# Patient Record
Sex: FEMALE | Race: WHITE | HISPANIC OR LATINO | Employment: FULL TIME | URBAN - METROPOLITAN AREA
[De-identification: names, ages, dates, MRNs, and addresses within clinical notes are randomized per-mention and may not be internally consistent; named-entity substitution may affect disease eponyms.]

---

## 2017-02-01 ENCOUNTER — TRANSFERRED RECORDS (OUTPATIENT)
Dept: HEALTH INFORMATION MANAGEMENT | Facility: CLINIC | Age: 32
End: 2017-02-01

## 2017-02-01 LAB — PAP SMEAR - HIM PATIENT REPORTED: NEGATIVE

## 2017-02-10 ENCOUNTER — TRANSFERRED RECORDS (OUTPATIENT)
Dept: CARDIOLOGY | Facility: CLINIC | Age: 32
End: 2017-02-10

## 2017-02-13 PROBLEM — R07.9 ACUTE CHEST PAIN: Status: ACTIVE | Noted: 2017-02-13

## 2017-02-13 PROBLEM — R00.2 PALPITATIONS: Status: ACTIVE | Noted: 2017-02-13

## 2017-02-13 PROBLEM — Z30.09 GENERAL COUNSELING FOR PRESCRIPTION OF ORAL CONTRACEPTIVES: Status: ACTIVE | Noted: 2017-02-13

## 2017-02-14 ENCOUNTER — OFFICE VISIT (OUTPATIENT)
Dept: CARDIOLOGY | Facility: CLINIC | Age: 32
End: 2017-02-14
Payer: COMMERCIAL

## 2017-02-14 VITALS
WEIGHT: 231.4 LBS | HEIGHT: 68 IN | HEART RATE: 101 BPM | BODY MASS INDEX: 35.07 KG/M2 | SYSTOLIC BLOOD PRESSURE: 134 MMHG | DIASTOLIC BLOOD PRESSURE: 84 MMHG

## 2017-02-14 DIAGNOSIS — R07.89 ATYPICAL CHEST PAIN: Primary | ICD-10-CM

## 2017-02-14 PROCEDURE — 93000 ELECTROCARDIOGRAM COMPLETE: CPT | Performed by: INTERNAL MEDICINE

## 2017-02-14 PROCEDURE — 99203 OFFICE O/P NEW LOW 30 MIN: CPT | Mod: 25 | Performed by: INTERNAL MEDICINE

## 2017-02-14 NOTE — MR AVS SNAPSHOT
"              After Visit Summary   2/14/2017    Nicolasa Moore    MRN: [id number]           Patient Information     Date Of Birth          1985        Visit Information        Provider Department      2/14/2017 9:15 AM Flaquito Mauro MD [facility] AT [place]        Today's Diagnoses     Atypical chest pain    -  1       Follow-ups after your visit        Who to contact     If you have questions or need follow up information about today's clinic visit or your schedule please contact [facility] directly at [phone].  Normal or non-critical lab and imaging results will be communicated to you by EveryScapehart, letter or phone within 4 business days after the clinic has received the results. If you do not hear from us within 7 days, please contact the clinic through EveryScapehart or phone. If you have a critical or abnormal lab result, we will notify you by phone as soon as possible.  Submit refill requests through [employer] or call your pharmacy and they will forward the refill request to us. Please allow 3 business days for your refill to be completed.          Additional Information About Your Visit        MyChart Information     [employer] gives you secure access to your electronic health record. If you see a primary care provider, you can also send messages to your care team and make appointments. If you have questions, please call your primary care clinic.  If you do not have a primary care provider, please call [phone] and they will assist you.        Care EveryWhere ID     This is your Care EveryWhere ID. This could be used by other organizations to access your [place] medical records  [id number]        Your Vitals Were     Pulse Height BMI (Body Mass Index)             101 1.727 m (5' 8\") 35.18 kg/m2          Blood Pressure from Last 3 Encounters:   02/14/17 134/84   06/22/15 120/78   05/20/15 108/68    Weight from Last 3 " Encounters:   02/14/17 105 kg (231 lb 6.4 oz)   06/22/15 105.7 kg (233 lb)   05/20/15 108.7 kg (239 lb 9.6 oz)              We Performed the Following     EKG 12-lead complete w/read - Clinics (performed today)        Primary Care Provider Office Phone # Fax #    Otis Murray [phone]       [facility] [address] [facility] [address]        Thank you!     Thank you for choosing [facility] [place]  for your care. Our goal is always to provide you with excellent care. Hearing back from our patients is one way we can continue to improve our services. Please take a few minutes to complete the written survey that you may receive in the mail after your visit with us. Thank you!             Your Updated Medication List - Protect others around you: Learn how to safely use, store and throw away your medicines at www.disposemymeds.org.          This list is accurate as of: 2/14/17  2:32 PM.  Always use your most recent med list.                   Brand Name Dispense Instructions for use    acetaminophen 650 MG 8 hour tablet     100 tablet    Take 650 mg by mouth every 4 hours as needed for mild pain or fever (greater than or equal to 38? C /100.4? F (oral) or 38.5? C/ 101.4? F (core).)       ibuprofen 400 MG tablet    ADVIL/MOTRIN    120 tablet    Take 1-2 tablets (400-800 mg) by mouth every 6 hours as needed for other (cramping)       norgestim-eth estrad triphasic 0.18/0.215/0.25 MG-35 MCG per tablet    TRINESSA (28)    84 tablet    Take 1 tablet by mouth daily

## 2017-02-14 NOTE — LETTER
2/14/2017    Otis Murray MD  [facility]   [address] [facility] [address]    RE: Nicolasa Moore       Dear Colleague,    I had the pleasure of seeing Nicolasa Moore in the [facility].    Ms. Moore is 31 years old and is here for followup after an episode of chest discomfort which is still ongoing.  She was evaluated at [facility] and was discharged.      Several days ago this young lady experienced the sudden onset of substernal chest discomfort which felt like somebody squeezing her from front to back.  Associated with this is palpitations.  She was out of breath.  She was finding it difficult to inspire deeply.  Occasionally, there would be numbness and tingling down the left arm as well.  She would feel dizzy when she has these symptoms.  While her symptoms were ongoing, an EKG done at [facility] showed normal findings.  A chest x-ray showed scoliosis but no significant cardiopulmonary lesions.  D-dimer was within normal limits, as was her basic metabolic panel.  She is known to be anemic.  She has just given birth to a baby girl.  This is her first pregnancy.      There is no history of diabetes, hypertension or hyperlipidemia.      Her family history is negative for premature atherosclerotic disease.  There is no history of drug, alcohol or tobacco use.  She has concerns about angina.  I explained to her that her symptoms are not very compatible with angina.  She is having ongoing chest pressure at this time.  EKG is normal.      PHYSICAL EXAMINATION:  Cardiovascular system is normal.  She denies any significant stresses in her life at this time.  There is no history of psychiatric illnesses.     Outpatient Encounter Prescriptions as of 2/14/2017   Medication Sig Dispense Refill     norgestim-eth estrad triphasic (TRINESSA, 28,) 0.18/0.215/0.25 MG-35 MCG per tablet Take 1 tablet by mouth daily 84 tablet 3      [DISCONTINUED] acetaminophen 650 MG TABS Take 650 mg by mouth every 4 hours as needed for mild pain or fever (greater than or equal to 38  C /100.4  F (oral) or 38.5  C/ 101.4  F (core).) 100 tablet      [DISCONTINUED] ibuprofen (ADVIL,MOTRIN) 400 MG tablet Take 1-2 tablets (400-800 mg) by mouth every 6 hours as needed for other (cramping) 120 tablet      [DISCONTINUED] norethindrone (MICRONOR) 0.35 MG tablet Take 1 tablet (0.35 mg) by mouth daily (Patient not taking: Reported on 2/14/2017) 3 Package 3     [DISCONTINUED] Prenatal Vit-Fe Fumarate-FA (PRENATAL MULTIVITAMIN  PLUS IRON) 27-0.8 MG TABS Take 1 tablet by mouth daily 100 tablet 3     Facility-Administered Encounter Medications as of 2/14/2017   Medication Dose Route Frequency Provider Last Rate Last Dose     ondansetron (ZOFRAN) injection 4 mg  4 mg Intravenous Q6H PRN Joe Capellan MD         NO Rho (D) immune globulin (RhoGam) needed - mother Rh NEGATIVE - NOT Clinically indicated at this time   Does not apply Continuous PRN Joe Capellan MD          ASSESSMENT AND PLAN:  I do think this lady's symptoms are compatible with hyperventilation syndrome, likely due to underlying anxiety.  I explained to her that at age of 31 and without cardiac risk factors, angina is extremely unlikely.  This is further supported by the fact that her EKG while she is having chest symptoms is completely normal.  No significant arrhythmias have been detected.  I reassured her, and she is discharged from our clinic.     Again, thank you for allowing me to participate in the care of your patient.      Sincerely,    DR JOE CLARKE MD     [facility]
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
## 2017-02-15 NOTE — PROGRESS NOTES
HISTORY OF PRESENT ILLNESS:  Ms. Moore is 31 years old and is here for followup after an episode of chest discomfort which is still ongoing.  She was evaluated at [facility] and was discharged.      Several days ago this young lady experienced the sudden onset of substernal chest discomfort which felt like somebody squeezing her from front to back.  Associated with this is palpitations.  She was out of breath.  She was finding it difficult to inspire deeply.  Occasionally, there would be numbness and tingling down the left arm as well.  She would feel dizzy when she has these symptoms.  While her symptoms were ongoing, an EKG done at [facility] showed normal findings.  A chest x-ray showed scoliosis but no significant cardiopulmonary lesions.  D-dimer was within normal limits, as was her basic metabolic panel.  She is known to be anemic.  She has just given birth to a baby girl.  This is her first pregnancy.      There is no history of diabetes, hypertension or hyperlipidemia.      Her family history is negative for premature atherosclerotic disease.  There is no history of drug, alcohol or tobacco use.  She has concerns about angina.  I explained to her that her symptoms are not very compatible with angina.  She is having ongoing chest pressure at this time.  EKG is normal.      PHYSICAL EXAMINATION:  Cardiovascular system is normal.  She denies any significant stresses in her life at this time.  There is no history of psychiatric illnesses.      ASSESSMENT AND PLAN:  I do think this lady's symptoms are compatible with hyperventilation syndrome, likely due to underlying anxiety.  I explained to her that at age of 31 and without cardiac risk factors, angina is extremely unlikely.  This is further supported by the fact that her EKG while she is having chest symptoms is completely normal.  No significant arrhythmias have been detected.  I reassured her, and she is discharged from our clinic.          JOE CLARKE MD, [facility]             D: 2017 14:32   T: 2017 20:16   MT: CHRISSY      Name:     DANIELE WILSON   MRN:      [id number]        Account:      [id number]   :      1985           Service Date: 2017      Document: [id number]

## 2017-03-07 ENCOUNTER — OFFICE VISIT (OUTPATIENT)
Dept: INTERNAL MEDICINE | Facility: CLINIC | Age: 32
End: 2017-03-07
Payer: COMMERCIAL

## 2017-03-07 VITALS
BODY MASS INDEX: 37.38 KG/M2 | SYSTOLIC BLOOD PRESSURE: 130 MMHG | WEIGHT: 232.6 LBS | DIASTOLIC BLOOD PRESSURE: 90 MMHG | HEIGHT: 66 IN | HEART RATE: 112 BPM | TEMPERATURE: 98.4 F | OXYGEN SATURATION: 100 %

## 2017-03-07 DIAGNOSIS — R00.2 PALPITATIONS: Primary | ICD-10-CM

## 2017-03-07 DIAGNOSIS — Z32.01 PREGNANCY TEST POSITIVE: ICD-10-CM

## 2017-03-07 DIAGNOSIS — N92.6 MISSED PERIOD: ICD-10-CM

## 2017-03-07 LAB — BETA HCG QUAL IFA URINE: POSITIVE

## 2017-03-07 PROCEDURE — 84703 CHORIONIC GONADOTROPIN ASSAY: CPT | Performed by: INTERNAL MEDICINE

## 2017-03-07 PROCEDURE — 99214 OFFICE O/P EST MOD 30 MIN: CPT | Performed by: INTERNAL MEDICINE

## 2017-03-07 NOTE — MR AVS SNAPSHOT
After Visit Summary   3/7/2017    Nicolasa Moore    MRN: [id number]           Patient Information     Date Of Birth          1985        Visit Information        Provider Department      3/7/2017 2:00 PM Nyasia Smith MD [facility]        Today's Diagnoses     Pregnancy test positive    -  1    Palpitations          Care Instructions    You are pregnant!    This is likely the cause of your symptoms (increased blood flow to support growing uterus and fetus), changes in hormones causing breast tenderness.    ---    To fully evaluate the palpitations, recommend a heart monitor.    We will contact you with scheduling information for the heart monitor.                   Follow-ups after your visit        Additional Services     OB/GYN REFERRAL       Your provider has referred you to:  [facility]: [facility] [phone]   [email or web address]    Please be aware that coverage of these services is subject to the terms and limitations of your health insurance plan.  Call member services at your health plan with any benefit or coverage questions.      Please bring the following with you to your appointment:    (1) Any X-Rays, CTs or MRIs which have been performed.  Contact the facility where they were done to arrange for  prior to your scheduled appointment.   (2) List of current medications   (3) This referral request   (4) Any documents/labs given to you for this referral                  Future tests that were ordered for you today     Open Future Orders        Priority Expected Expires Ordered    Zio Patch Holter Routine  4/21/2017 3/7/2017            Who to contact     If you have questions or need follow up information about today's clinic visit or your schedule please contact [facility] directly at [phone].  Normal or non-critical lab and imaging results will be  "communicated to you by [employer], letter or phone within 4 business days after the clinic has received the results. If you do not hear from us within 7 days, please contact the clinic through Touch[employer] or phone. If you have a critical or abnormal lab result, we will notify you by phone as soon as possible.  Submit refill requests through Touch[employer] or call your pharmacy and they will forward the refill request to us. Please allow 3 business days for your refill to be completed.          Additional Information About Your Visit        Touchstorm Information     Touch[employer] gives you secure access to your electronic health record. If you see a primary care provider, you can also send messages to your care team and make appointments. If you have questions, please call your primary care clinic.  If you do not have a primary care provider, please call [phone] and they will assist you.        Care EveryWhere ID     This is your Care EveryWhere ID. This could be used by other organizations to access your [place] medical records  [id number]        Your Vitals Were     Pulse Temperature Height Last Period Pulse Oximetry Breastfeeding?    112 98.4  F (36.9  C) (Oral) 5' 6\" (1.676 m) 01/17/2017 (Approximate) 100% No    BMI (Body Mass Index)                   37.54 kg/m2            Blood Pressure from Last 3 Encounters:   03/07/17 130/90   02/14/17 134/84   06/22/15 120/78    Weight from Last 3 Encounters:   03/07/17 232 lb 9.6 oz (105.5 kg)   02/14/17 231 lb 6.4 oz (105 kg)   06/22/15 233 lb (105.7 kg)              We Performed the Following     Beta HCG qual IFA urine     OB/GYN REFERRAL        Primary Care Provider Office Phone # Fax #    Otis Murray [phone]       [facility] [address] [facility] [address]        Thank you!     Thank you for choosing [facility]  for your care. Our goal is always to provide you with excellent care. Hearing back from our " patients is one way we can continue to improve our services. Please take a few minutes to complete the written survey that you may receive in the mail after your visit with us. Thank you!             Your Updated Medication List - Protect others around you: Learn how to safely use, store and throw away your medicines at www.disposemymeds.org.          This list is accurate as of: 3/7/17  2:21 PM.  Always use your most recent med list.                   Brand Name Dispense Instructions for use    norgestim-eth estrad triphasic 0.18/0.215/0.25 MG-35 MCG per tablet    TRINESSA (28)    84 tablet    Take 1 tablet by mouth daily

## 2017-03-07 NOTE — PATIENT INSTRUCTIONS
You are pregnant!    This is likely the cause of your symptoms (increased blood flow to support growing uterus and fetus), changes in hormones causing breast tenderness.    ---    To fully evaluate the palpitations, recommend a heart monitor.    We will contact you with scheduling information for the heart monitor.

## 2017-03-07 NOTE — NURSING NOTE
"Chief Complaint   Patient presents with     Palpitations     x 1 month. Palpitations and L sided chest pain, Tingling in L arm. Home Pregnancy test positive last night.       Initial /90 (BP Location: Left arm, Patient Position: Chair, Cuff Size: Adult Regular)  Pulse 112  Temp 98.4  F (36.9  C) (Oral)  Ht 5' 6\" (1.676 m)  Wt 232 lb 9.6 oz (105.5 kg)  LMP 01/17/2017 (Approximate)  SpO2 100%  Breastfeeding? No  BMI 37.54 kg/m2 Estimated body mass index is 37.54 kg/(m^2) as calculated from the following:    Height as of this encounter: 5' 6\" (1.676 m).    Weight as of this encounter: 232 lb 9.6 oz (105.5 kg).  Medication Reconciliation: complete     Kaminibose MA    "

## 2017-03-07 NOTE — PROGRESS NOTES
"  SUBJECTIVE:                                                      HPI: Nicolasa Moore is a pleasant 31 year old female who presents with palpitations and chest pain:    - ongoing issue for last month  - has been seen at [facility] ER and by cardiology  - physical exam unremarkable other than sinus tachycardia  - labs including D-Dimer, EKGs, and chest xray have all been unrevealing    - patient's symptoms have continued  - unclear if symptoms are continuous or episodic - patient reports both at different times  - symptoms include:   - left sided chest pain     - dull/achy    - radiation to left axilla, left back and up left neck and head   - left-sided chest pressure and heaviness   - palpitations   - shortness of breath   - light-headedness and presyncope   - extreme fatigue   - cold sweats   - nausea and occasional vomiting    - admits to being under some stress, but doesn't feel particularly anxious except about her symptoms  - no personal or FH of mood disorders    Patient also reports a positive home pregnancy test:  - on OCP  - was supposed to get period ~1-2 weeks ago - never came  - has also noticed increased breast tenderness  - admits to missing a couple doses of OCP and taking other doses at irregular times  - pregnancy test in ER  was negative    No significant active medical problems.  (baby born 5/5/15).  a  The medication, allergy, and problem lists have been reviewed and updated as appropriate.     OBJECTIVE:                                                      /90 (BP Location: Left arm, Patient Position: Chair, Cuff Size: Adult Regular)  Pulse 112  Temp 98.4  F (36.9  C) (Oral)  Ht 5' 6\" (1.676 m)  Wt 232 lb 9.6 oz (105.5 kg)  LMP 2017 (Approximate)  SpO2 100%  Breastfeeding? No  BMI 37.54 kg/m2  Constitutional: well-appearing  Respiratory: normal respiratory effort; clear to auscultation bilaterally  Cardiovascular: tachycardic; no edema  Gastrointestinal: " soft, non-tender, non-distended, and bowel sounds present  Musculoskeletal: normal gait and station  Psych: normal judgment and insight; normal mood and affect; recent and remote memory intact    Urine pregnancy test (today - in lab): positive    ASSESSMENT/PLAN:                                                      (R00.2) Palpitations  (primary encounter diagnosis)  Comment:    - strongly suspect that patient's symptoms are related to pregnancy.    - change in hormones and increase in blood flow during pregnancy likely account for most of her symptoms.   - generally reassured by negative work-up to date - do not think anything acute is occurring.   - cannot rule out periodic arrhythmia, but again suspect mild tachycardia due to pregnancy.  Plan:    - discussed my suspicions with patient who feels somewhat reassured.   - offered, but did not recommend, a Holter monitor or Zio patch to further evaluate patient's palpitations - she declines for now.   - if palpitations worsen, change, or persist, patient to contact me - can order rhythm monitoring then.     (N92.6) Missed period  (Z32.01) Pregnancy test positive  Comment:    - since pregnancy test was negative last month, patient is likely in very early pregnancy 4-6 weeks.   - this is likely due to not using OCP consistently.   Plan:    - STOP OCP.   - STOP all NSAIDs (ibuprofen, Advil, Motrin, Aleve, aspirin); may use Tylenol as needed.   - referred to ob/gyn for continued care.     The instructions on the AVS were discussed and explained to the patient. Patient expressed understanding of instructions.    A total of 25 minutes were spent face-to-face with this patient during this encounter and over half of that time was spent on counseling and coordination of care re: above diagnoses and plans of care.     Nyasia Smith MD   [address] [address]  T: [phone], F: [phone]

## 2017-03-08 DIAGNOSIS — Z32.01 PREGNANCY TEST POSITIVE: Primary | ICD-10-CM

## 2017-04-12 ENCOUNTER — RADIANT APPOINTMENT (OUTPATIENT)
Dept: ULTRASOUND IMAGING | Facility: CLINIC | Age: 32
End: 2017-04-12
Attending: OBSTETRICS & GYNECOLOGY
Payer: COMMERCIAL

## 2017-04-12 ENCOUNTER — ALLIED HEALTH/NURSE VISIT (OUTPATIENT)
Dept: NURSING | Facility: CLINIC | Age: 32
End: 2017-04-12
Payer: COMMERCIAL

## 2017-04-12 DIAGNOSIS — Z32.01 PREGNANCY TEST POSITIVE: ICD-10-CM

## 2017-04-12 LAB
ABO + RH BLD: NORMAL
ABO + RH BLD: NORMAL
BLD GP AB SCN SERPL QL: NORMAL
BLOOD BANK CMNT PATIENT-IMP: NORMAL
ERYTHROCYTE [DISTWIDTH] IN BLOOD BY AUTOMATED COUNT: 15.3 % (ref 10–15)
HCT VFR BLD AUTO: 36.1 % (ref 35–47)
HGB BLD-MCNC: 11.9 G/DL (ref 11.7–15.7)
MCH RBC QN AUTO: 26.6 PG (ref 26.5–33)
MCHC RBC AUTO-ENTMCNC: 33 G/DL (ref 31.5–36.5)
MCV RBC AUTO: 81 FL (ref 78–100)
PLATELET # BLD AUTO: 245 10E9/L (ref 150–450)
RBC # BLD AUTO: 4.47 10E12/L (ref 3.8–5.2)
SPECIMEN EXP DATE BLD: NORMAL
WBC # BLD AUTO: 11.8 10E9/L (ref 4–11)

## 2017-04-12 PROCEDURE — 86900 BLOOD TYPING SEROLOGIC ABO: CPT | Performed by: OBSTETRICS & GYNECOLOGY

## 2017-04-12 PROCEDURE — 86850 RBC ANTIBODY SCREEN: CPT | Performed by: OBSTETRICS & GYNECOLOGY

## 2017-04-12 PROCEDURE — 87086 URINE CULTURE/COLONY COUNT: CPT | Performed by: OBSTETRICS & GYNECOLOGY

## 2017-04-12 PROCEDURE — 85027 COMPLETE CBC AUTOMATED: CPT | Performed by: OBSTETRICS & GYNECOLOGY

## 2017-04-12 PROCEDURE — 36415 COLL VENOUS BLD VENIPUNCTURE: CPT | Performed by: OBSTETRICS & GYNECOLOGY

## 2017-04-12 PROCEDURE — 99207 ZZC NO CHARGE NURSE ONLY: CPT

## 2017-04-12 PROCEDURE — 86901 BLOOD TYPING SEROLOGIC RH(D): CPT | Performed by: OBSTETRICS & GYNECOLOGY

## 2017-04-12 PROCEDURE — 76801 OB US < 14 WKS SINGLE FETUS: CPT | Performed by: OBSTETRICS & GYNECOLOGY

## 2017-04-12 PROCEDURE — 87389 HIV-1 AG W/HIV-1&-2 AB AG IA: CPT | Performed by: OBSTETRICS & GYNECOLOGY

## 2017-04-12 PROCEDURE — 86780 TREPONEMA PALLIDUM: CPT | Performed by: OBSTETRICS & GYNECOLOGY

## 2017-04-12 PROCEDURE — 86762 RUBELLA ANTIBODY: CPT | Performed by: OBSTETRICS & GYNECOLOGY

## 2017-04-12 PROCEDURE — 87340 HEPATITIS B SURFACE AG IA: CPT | Performed by: OBSTETRICS & GYNECOLOGY

## 2017-04-12 NOTE — NURSING NOTE
"NEW PRENATAL EDUCATION  The following encounter information was actually performed during a group prenatal education class on 4/12/17 by HERLINDA Castaneda RN  and entered at a later date.  Patient given information for prenatal education along with \"The Expectant Family\" booklet.     "

## 2017-04-12 NOTE — MR AVS SNAPSHOT
After Visit Summary   4/12/2017    Nicolasa Moore    MRN: [id number]           Patient Information     Date Of Birth          1985        Visit Information        Provider Department      4/12/2017 9:00 AM OX PRENATAL NURSE [facility]        Today's Diagnoses     Pregnancy test positive           Follow-ups after your visit        Who to contact     If you have questions or need follow up information about today's clinic visit or your schedule please contact [facility] directly at [phone].  Normal or non-critical lab and imaging results will be communicated to you by Valkeehart, letter or phone within 4 business days after the clinic has received the results. If you do not hear from us within 7 days, please contact the clinic through [employer] or phone. If you have a critical or abnormal lab result, we will notify you by phone as soon as possible.  Submit refill requests through Evri or call your pharmacy and they will forward the refill request to us. Please allow 3 business days for your refill to be completed.          Additional Information About Your Visit        MyChart Information     Evri gives you secure access to your electronic health record. If you see a primary care provider, you can also send messages to your care team and make appointments. If you have questions, please call your primary care clinic.  If you do not have a primary care provider, please call [phone] and they will assist you.        Care EveryWhere ID     This is your Care EveryWhere ID. This could be used by other organizations to access your [place] medical records  [id number]        Your Vitals Were     Last Period                   01/17/2017 (Approximate)            Blood Pressure from Last 3 Encounters:   03/07/17 130/90   02/14/17 134/84   06/22/15 120/78    Weight from Last 3 Encounters:   03/07/17 232 lb 9.6 oz (105.5 kg)   02/14/17 231 lb  6.4 oz (105 kg)   06/22/15 233 lb (105.7 kg)              We Performed the Following     ABO/Rh type and screen     Anti Treponema     CBC with platelets     Hepatitis B surface antigen     HIV Antigen Antibody Combo     Rubella Antibody IgG Quantitative     Urine Culture Aerobic Bacterial        Primary Care Provider Office Phone # Fax #    Otis Murray [phone]       XXX RETIRED  [address] [facility] [address]        Thank you!     Thank you for choosing [facility]  for your care. Our goal is always to provide you with excellent care. Hearing back from our patients is one way we can continue to improve our services. Please take a few minutes to complete the written survey that you may receive in the mail after your visit with us. Thank you!             Your Updated Medication List - Protect others around you: Learn how to safely use, store and throw away your medicines at www.disposemymeds.org.          This list is accurate as of: 4/12/17 11:36 AM.  Always use your most recent med list.                   Brand Name Dispense Instructions for use    norgestim-eth estrad triphasic 0.18/0.215/0.25 MG-35 MCG per tablet    TRINESSA (28)    84 tablet    Take 1 tablet by mouth daily

## 2017-04-13 LAB
BACTERIA SPEC CULT: NORMAL
HBV SURFACE AG SERPL QL IA: NONREACTIVE
HIV 1+2 AB+HIV1 P24 AG SERPL QL IA: NORMAL
MICRO REPORT STATUS: NORMAL
RUBV IGG SERPL IA-ACNC: 45 IU/ML
SPECIMEN SOURCE: NORMAL
T PALLIDUM IGG+IGM SER QL: NEGATIVE

## 2017-04-17 ENCOUNTER — TELEPHONE (OUTPATIENT)
Dept: OBGYN | Facility: CLINIC | Age: 32
End: 2017-04-17

## 2017-04-17 NOTE — TELEPHONE ENCOUNTER
This patient is calling and the orders need to be entered for her first trimester labwork and wants to have test for Down syndrome .Kimmie Nicolas RN

## 2017-04-17 NOTE — TELEPHONE ENCOUNTER
CHINO for pt to cb.  I wanted to clarify which orders she would like for genetic screening (1st tri screen through [facility] or the quad screen).  We can place orders once she calls back.  I want to see if she checked with insurance if she wants the Progenity early screen.    Morelia Mckeon R.N.

## 2017-08-24 ENCOUNTER — OFFICE VISIT (OUTPATIENT)
Dept: INTERNAL MEDICINE | Facility: CLINIC | Age: 32
End: 2017-08-24
Payer: COMMERCIAL

## 2017-08-24 ENCOUNTER — HOSPITAL ENCOUNTER (EMERGENCY)
Facility: CLINIC | Age: 32
Discharge: HOME | End: 2017-08-24
Attending: EMERGENCY MEDICINE | Admitting: EMERGENCY MEDICINE
Payer: COMMERCIAL

## 2017-08-24 ENCOUNTER — TELEPHONE (OUTPATIENT)
Dept: INTERNAL MEDICINE | Facility: CLINIC | Age: 32
End: 2017-08-24

## 2017-08-24 ENCOUNTER — APPOINTMENT (OUTPATIENT)
Dept: CT IMAGING | Facility: CLINIC | Age: 32
End: 2017-08-24
Attending: EMERGENCY MEDICINE
Payer: COMMERCIAL

## 2017-08-24 VITALS
HEIGHT: 66 IN | SYSTOLIC BLOOD PRESSURE: 108 MMHG | HEART RATE: 96 BPM | DIASTOLIC BLOOD PRESSURE: 61 MMHG | WEIGHT: 234.6 LBS | RESPIRATION RATE: 18 BRPM | OXYGEN SATURATION: 97 % | TEMPERATURE: 98.1 F | BODY MASS INDEX: 37.7 KG/M2

## 2017-08-24 VITALS
DIASTOLIC BLOOD PRESSURE: 78 MMHG | BODY MASS INDEX: 37.62 KG/M2 | SYSTOLIC BLOOD PRESSURE: 112 MMHG | TEMPERATURE: 98.3 F | HEART RATE: 121 BPM | WEIGHT: 234.1 LBS | HEIGHT: 66 IN | OXYGEN SATURATION: 98 %

## 2017-08-24 DIAGNOSIS — R00.2 PALPITATIONS: ICD-10-CM

## 2017-08-24 DIAGNOSIS — R07.9 ACUTE CHEST PAIN: ICD-10-CM

## 2017-08-24 DIAGNOSIS — R00.0 TACHYCARDIA: ICD-10-CM

## 2017-08-24 DIAGNOSIS — R07.89 ATYPICAL CHEST PAIN: Primary | ICD-10-CM

## 2017-08-24 DIAGNOSIS — R06.02 SHORTNESS OF BREATH: ICD-10-CM

## 2017-08-24 LAB
ALBUMIN SERPL-MCNC: 2.5 G/DL (ref 3.4–5)
ALP SERPL-CCNC: 127 U/L (ref 40–150)
ALT SERPL W P-5'-P-CCNC: 26 U/L (ref 0–50)
ANION GAP SERPL CALCULATED.3IONS-SCNC: 7 MMOL/L (ref 3–14)
AST SERPL W P-5'-P-CCNC: 24 U/L (ref 0–45)
BILIRUB SERPL-MCNC: 0.3 MG/DL (ref 0.2–1.3)
BUN SERPL-MCNC: 8 MG/DL (ref 7–30)
CALCIUM SERPL-MCNC: 8.6 MG/DL (ref 8.5–10.1)
CHLORIDE SERPL-SCNC: 105 MMOL/L (ref 94–109)
CO2 SERPL-SCNC: 25 MMOL/L (ref 20–32)
CREAT SERPL-MCNC: 0.52 MG/DL (ref 0.52–1.04)
D DIMER PPP FEU-MCNC: 1.8 UG/ML FEU (ref 0–0.5)
ERYTHROCYTE [DISTWIDTH] IN BLOOD BY AUTOMATED COUNT: 15.8 % (ref 10–15)
GFR SERPL CREATININE-BSD FRML MDRD: >90 ML/MIN/1.7M2
GLUCOSE SERPL-MCNC: 83 MG/DL (ref 70–99)
HCT VFR BLD AUTO: 34.3 % (ref 35–47)
HGB BLD-MCNC: 10.9 G/DL (ref 11.7–15.7)
INTERPRETATION ECG - MUSE: NORMAL
INTERPRETATION ECG - MUSE: NORMAL
MCH RBC QN AUTO: 25.9 PG (ref 26.5–33)
MCHC RBC AUTO-ENTMCNC: 31.8 G/DL (ref 31.5–36.5)
MCV RBC AUTO: 82 FL (ref 78–100)
PLATELET # BLD AUTO: 246 10E9/L (ref 150–450)
POTASSIUM SERPL-SCNC: 4.3 MMOL/L (ref 3.4–5.3)
PROT SERPL-MCNC: 7.3 G/DL (ref 6.8–8.8)
RBC # BLD AUTO: 4.21 10E12/L (ref 3.8–5.2)
SODIUM SERPL-SCNC: 137 MMOL/L (ref 133–144)
TROPONIN I SERPL-MCNC: <0.015 UG/L (ref 0–0.04)
TSH SERPL DL<=0.005 MIU/L-ACNC: 1.33 MU/L (ref 0.4–4)
WBC # BLD AUTO: 12.2 10E9/L (ref 4–11)

## 2017-08-24 PROCEDURE — 36415 COLL VENOUS BLD VENIPUNCTURE: CPT | Performed by: INTERNAL MEDICINE

## 2017-08-24 PROCEDURE — 84443 ASSAY THYROID STIM HORMONE: CPT | Performed by: INTERNAL MEDICINE

## 2017-08-24 PROCEDURE — 96361 HYDRATE IV INFUSION ADD-ON: CPT

## 2017-08-24 PROCEDURE — 25000128 H RX IP 250 OP 636: Performed by: EMERGENCY MEDICINE

## 2017-08-24 PROCEDURE — 85027 COMPLETE CBC AUTOMATED: CPT | Performed by: INTERNAL MEDICINE

## 2017-08-24 PROCEDURE — 85379 FIBRIN DEGRADATION QUANT: CPT | Performed by: INTERNAL MEDICINE

## 2017-08-24 PROCEDURE — 80053 COMPREHEN METABOLIC PANEL: CPT | Performed by: INTERNAL MEDICINE

## 2017-08-24 PROCEDURE — 99285 EMERGENCY DEPT VISIT HI MDM: CPT | Mod: 25

## 2017-08-24 PROCEDURE — 96360 HYDRATION IV INFUSION INIT: CPT | Mod: 59

## 2017-08-24 PROCEDURE — 71260 CT THORAX DX C+: CPT

## 2017-08-24 PROCEDURE — 25000125 ZZHC RX 250: Performed by: EMERGENCY MEDICINE

## 2017-08-24 PROCEDURE — 93005 ELECTROCARDIOGRAM TRACING: CPT

## 2017-08-24 PROCEDURE — 93000 ELECTROCARDIOGRAM COMPLETE: CPT | Performed by: INTERNAL MEDICINE

## 2017-08-24 PROCEDURE — 99214 OFFICE O/P EST MOD 30 MIN: CPT | Performed by: INTERNAL MEDICINE

## 2017-08-24 PROCEDURE — 93005 ELECTROCARDIOGRAM TRACING: CPT | Mod: 76

## 2017-08-24 PROCEDURE — 84484 ASSAY OF TROPONIN QUANT: CPT | Performed by: EMERGENCY MEDICINE

## 2017-08-24 PROCEDURE — 59025 FETAL NON-STRESS TEST: CPT

## 2017-08-24 RX ORDER — IOPAMIDOL 755 MG/ML
79 INJECTION, SOLUTION INTRAVASCULAR ONCE
Status: COMPLETED | OUTPATIENT
Start: 2017-08-24 | End: 2017-08-24

## 2017-08-24 RX ORDER — PRENATAL VIT/IRON FUM/FOLIC AC 27MG-0.8MG
1 TABLET ORAL DAILY
COMMUNITY

## 2017-08-24 RX ADMIN — IOPAMIDOL 79 ML: 755 INJECTION, SOLUTION INTRAVENOUS at 17:55

## 2017-08-24 RX ADMIN — SODIUM CHLORIDE 101 ML: 9 INJECTION, SOLUTION INTRAVENOUS at 17:56

## 2017-08-24 RX ADMIN — SODIUM CHLORIDE 1000 ML: 9 INJECTION, SOLUTION INTRAVENOUS at 15:55

## 2017-08-24 ASSESSMENT — ENCOUNTER SYMPTOMS
VOMITING: 1
NECK PAIN: 1
ABDOMINAL PAIN: 1
BACK PAIN: 1
COUGH: 1
LIGHT-HEADEDNESS: 1
DIZZINESS: 1
CHILLS: 1

## 2017-08-24 NOTE — PATIENT INSTRUCTIONS
EKG today.    ---    Labs - please proceed to our first floor laboratory to have these drawn (show them your orange ticket).     Results:    If normal: we will release results in MyCKOTURAt or send them in the mail. You will not be called for these results.    If abnormal, but non-urgent: we will release results in MyChart or send them in the mail. You will not be called for these results.    If abnormal and urgent: we will call you.

## 2017-08-24 NOTE — ED AVS SNAPSHOT
Emergency Department    [address] [facility] [address]    Phone:  [phone]    Fax:  [phone]                                       Nicolasa Moore   MRN: [id number]    Department:   Emergency Department   Date of Visit:  8/24/2017           Patient Information     Date Of Birth          1985        Your diagnoses for this visit were:     Tachycardia     Acute chest pain        You were seen by Riley Sheikh MD.      Follow-up Information     Follow up with Nyasia Smith MD.    Specialty:  Internal Medicine    Contact information:    [address] [facility] [address]  [phone]          Follow up with Phan Bill MD In 1 day.    Specialty:  OB/Gyn    Contact information:    [facility]  [address] [address] [phone]          Discharge Instructions       Discharge Instructions  Chest Pain    You have been seen today for chest pain or discomfort.  At this time, your doctor has found no signs that your chest pain is due to a serious or life-threatening condition, (or you have declined more testing and/or admission to the hospital). However, sometimes there is a serious problem that does not show up right away. Your evaluation today may not be complete and you may need further testing and evaluation.     You need to follow-up with your regular doctor within 3 days.    Return to the Emergency Department if:    Your chest pain changes, gets worse, starts to happen more often, or comes with less activity.    You are short of breath.    You get very weak or tired.    You pass out or faint.    You have any new symptoms, like fever, cough, numb legs, or you cough up blood.    You have anything else that worries you.    Until you follow-up with your regular doctor please do the following:    Take one aspirin daily unless you have an allergy or are told not to by your doctor.    If a stress test appointment has been made, go to  the appointment.    If you have questions, contact your regular doctor.    If your doctor today has told you to follow-up with your regular doctor, it is very important that you make an appointment with your clinic and go to the appointment.  If you do not follow-up with your primary doctor, it may result in missing an important development which could result in permanent injury or disability and/or lasting pain.  If there is any problem keeping your appointment, call your doctor or return to the Emergency Department.    If you were given a prescription for medicine here today, be sure to read all of the information (including the package insert) that comes with your prescription.  This will include important information about the medicine, its side effects, and any warnings that you need to know about.  The pharmacist who fills the prescription can provide more information and answer questions you may have about the medicine.  If you have questions or concerns that the pharmacist cannot address, please call or return to the Emergency Department.     Opioid Medication Information    Pain medications are among the most commonly prescribed medicines, so we are including this information for all our patients. If you did not receive pain medication or get a prescription for pain medicine, you can ignore it.     You may have been given a prescription for an opioid (narcotic) pain medicine and/or have received a pain medicine while here in the Emergency Department. These medicines can make you drowsy or impaired. You must not drive, operate dangerous equipment, or engage in any other dangerous activities while taking these medications. If you drive while taking these medications, you could be arrested for DUI, or driving under the influence. Do not drink any alcohol while you are taking these medications.     Opioid pain medications can cause addiction. If you have a history of chemical dependency of any type, you are at  a higher risk of becoming addicted to pain medications.  Only take these prescribed medications to treat your pain when all other options have been tried. Take it for as short a time and as few doses as possible. Store your pain pills in a secure place, as they are frequently stolen and provide a dangerous opportunity for children or visitors in your house to start abusing these powerful medications. We will not replace any lost or stolen medicine.  As soon as your pain is better, you should flush all your remaining medication.     Many prescription pain medications contain Tylenol  (acetaminophen), including Vicodin , Tylenol #3 , Norco , Lortab , and Percocet .  You should not take any extra pills of Tylenol  if you are using these prescription medications or you can get very sick.  Do not ever take more than 3000 mg of acetaminophen in any 24 hour period.    All opioids tend to cause constipation. Drink plenty of water and eat foods that have a lot of fiber, such as fruits, vegetables, prune juice, apple juice and high fiber cereal.  Take a laxative if you don t move your bowels at least every other day. Miralax , Milk of Magnesia, Colace , or Senna  can be used to keep you regular.      Remember that you can always come back to the Emergency Department if you are not able to see your regular doctor in the amount of time listed above, if you get any new symptoms, or if there is anything that worries you.          24 Hour Appointment Hotline       To make an appointment at any [facility], call [phone] ([phone]). If you don't have a family doctor or clinic, we will help you find one. [facility] are conveniently located to serve the needs of you and your family.          ED Discharge Orders     Echocardiogram       Administration of IV contrast will be tailored to this examination per the appropriate written protocol listed in the Echocardiography department Protocol Book, or by the  supervising Cardiologist. This may result in an order change.    Use of contrast is at the discretion of the supervising Cardiologist.                     Review of your medicines      Our records show that you are taking the medicines listed below. If these are incorrect, please call your family doctor or clinic.        Dose / Directions Last dose taken    norgestim-eth estrad triphasic 0.18/0.215/0.25 MG-35 MCG per tablet   Commonly known as:  TRINESSA (28)   Dose:  1 tablet   Quantity:  84 tablet        Take 1 tablet by mouth daily   Refills:  3        prenatal multivitamin plus iron 27-0.8 MG Tabs per tablet   Dose:  1 tablet        Take 1 tablet by mouth daily   Refills:  0        VITAMIN D (CHOLECALCIFEROL) PO   Dose:  400 Units        Take 400 Units by mouth daily   Refills:  0                Procedures and tests performed during your visit     Procedure/Test Number of Times Performed    Chest CT, IV contrast only - PE protocol 1    EKG 12 lead 2    IV access 1    Troponin I 1      Orders Needing Specimen Collection     None      Pending Results     Date and Time Order Name Status Description    8/24/2017 1529 Chest CT, IV contrast only - PE protocol Preliminary             Pending Culture Results     No orders found from 8/22/2017 to 8/25/2017.            Pending Results Instructions     If you had any lab results that were not finalized at the time of your Discharge, you can call the ED Lab Result RN at [phone]. You will be contacted by this team for any positive Lab results or changes in treatment. The nurses are available 7 days a week from 10A to 6:30P.  You can leave a message 24 hours per day and they will return your call.        Test Results From Your Hospital Stay        8/24/2017  6:32 PM      Narrative     CT CHEST PULMONARY EMBOLISM W CONTRAST  8/24/2017 5:55 PM     HISTORY: Shortness of breath and chest pain with tachycardia. Patient  is 7 months pregnant. Evaluate for pulmonary  embolism.    TECHNIQUE: Helical axial scans from lung apices through lung bases  with 158 mL Isovue-370 IV contrast (2 contrast injections were  required due to poor timing of the bolus on the first exam). Radiation  dose for this scan was reduced using automated exposure control,  adjustment of the mA and/or kV according to patient size, or iterative  reconstruction technique.    COMPARISON: 8/29/2013.    FINDINGS: No CT evidence for pulmonary embolism or other acute  vascular abnormality of the chest. Mediastinal and hilar structures  are unremarkable. The lungs are clear bilaterally. Visualized upper  abdomen is unremarkable. There is curvature of the lower thoracic  spine convex to the right.        Impression     IMPRESSION:  1. No evidence for pulmonary embolism.  2. Curvature thoracic spine convex to the right.  3. Otherwise unremarkable CT chest and no change since the prior exam.         8/24/2017  4:29 PM      Component Results     Component Value Ref Range & Units Status    Troponin I ES <0.015 0.000 - 0.045 ug/L Final    The 99th percentile for upper reference range is 0.045 ug/L.  Troponin values   in the range of 0.045 - 0.120 ug/L may be associated with risks of adverse   clinical events.                  Clinical Quality Measure: Blood Pressure Screening     Your blood pressure was checked while you were in the emergency department today. The last reading we obtained was  BP: 108/61 . Please read the guidelines below about what these numbers mean and what you should do about them.  If your systolic blood pressure (the top number) is less than 120 and your diastolic blood pressure (the bottom number) is less than 80, then your blood pressure is normal. There is nothing more that you need to do about it.  If your systolic blood pressure (the top number) is 120-139 or your diastolic blood pressure (the bottom number) is 80-89, your blood pressure may be higher than it should be. You should have your  blood pressure rechecked within a year by a primary care provider.  If your systolic blood pressure (the top number) is 140 or greater or your diastolic blood pressure (the bottom number) is 90 or greater, you may have high blood pressure. High blood pressure is treatable, but if left untreated over time it can put you at risk for heart attack, stroke, or kidney failure. You should have your blood pressure rechecked by a primary care provider within the next 4 weeks.  If your provider in the emergency department today gave you specific instructions to follow-up with your doctor or provider even sooner than that, you should follow that instruction and not wait for up to 4 weeks for your follow-up visit.        Thank you for choosing [place]       Thank you for choosing [facility] for your care. Our goal is always to provide you with excellent care. Hearing back from our patients is one way we can continue to improve our services. Please take a few minutes to complete the written survey that you may receive in the mail after you visit with us. Thank you!        WorkVoicesharDime Information     [email or web address] gives you secure access to your electronic health record. If you see a primary care provider, you can also send messages to your care team and make appointments. If you have questions, please call your primary care clinic.  If you do not have a primary care provider, please call [phone] and they will assist you.        Care EveryWhere ID     This is your Care EveryWhere ID. This could be used by other organizations to access your [place] medical records  [id number]        Equal Access to Services     PRASANTH AJ : Hadii porter Conley, waaxda wayne, qaybta makalpricilla mirza . So [facility] [phone].    ATENCIÓN: Si habla español, tiene a jimenez disposición servicios gratuitos de asistencia lingüística. Llame al [phone].    We comply with applicable federal civil rights  laws and [place] laws. We do not discriminate on the basis of race, color, national origin, age, disability sex, sexual orientation or gender identity.            After Visit Summary       This is your record. Keep this with you and show to your community pharmacist(s) and doctor(s) at your next visit.

## 2017-08-24 NOTE — ED NOTES
Bed: ED04  Expected date:   Expected time:   Means of arrival:   Comments:  OB pt  eval for blood clot leg and chest pain

## 2017-08-24 NOTE — PLAN OF CARE
Called to ER to monitor. 31wk pregnant multip being evaluated for chest pain and leg pain.  Phone call to Dr Archer, updated on pt in ER, orders given for NST and workup by ER.  Discussed maternal fetal status update with Dr Archer and NST.  Reactive NST, + accels noted and audible fetal movement.  Pt denies bleeding or leaking of fluid.  ER assuming care and evaluation.

## 2017-08-24 NOTE — MR AVS SNAPSHOT
After Visit Summary   8/24/2017    Nicolasa Moore    MRN: [id number]           Patient Information     Date Of Birth          1985        Visit Information        Provider Department      8/24/2017 1:00 PM Nyasia Smith MD [facility]        Today's Diagnoses     Atypical chest pain    -  1    Palpitations        Shortness of breath          Care Instructions    EKG today.    ---    Labs - please proceed to our first floor laboratory to have these drawn (show them your orange ticket).     Results:    If normal: we will release results in SmartRecruitershart or send them in the mail. You will not be called for these results.    If abnormal, but non-urgent: we will release results in MyChart or send them in the mail. You will not be called for these results.    If abnormal and urgent: we will call you.                  Follow-ups after your visit        Who to contact     If you have questions or need follow up information about today's clinic visit or your schedule please contact [facility] directly at [phone].  Normal or non-critical lab and imaging results will be communicated to you by MyChart, letter or phone within 4 business days after the clinic has received the results. If you do not hear from us within 7 days, please contact the clinic through BadAbroadt or phone. If you have a critical or abnormal lab result, we will notify you by phone as soon as possible.  Submit refill requests through [email or web address] or call your pharmacy and they will forward the refill request to us. Please allow 3 business days for your refill to be completed.          Additional Information About Your Visit        SmartRecruitershart Information     [email or web address] gives you secure access to your electronic health record. If you see a primary care provider, you can also send messages to your care team and make appointments. If you have questions, please call your primary care clinic.  If  "you do not have a primary care provider, please call [phone] and they will assist you.        Care EveryWhere ID     This is your Care EveryWhere ID. This could be used by other organizations to access your [place] medical records  [id number]        Your Vitals Were     Pulse Temperature Height Last Period Pulse Oximetry BMI (Body Mass Index)    121 98.3  F (36.8  C) (Oral) 5' 6\" (1.676 m) 01/17/2017 (Approximate) 98% 37.78 kg/m2       Blood Pressure from Last 3 Encounters:   08/24/17 112/78   03/07/17 130/90   02/14/17 134/84    Weight from Last 3 Encounters:   08/24/17 234 lb 1.6 oz (106.2 kg)   03/07/17 232 lb 9.6 oz (105.5 kg)   02/14/17 231 lb 6.4 oz (105 kg)              We Performed the Following     CBC with platelets     Comprehensive metabolic panel     D dimer, quantitative     EKG 12-lead complete w/read - Clinics     TSH with free T4 reflex        Primary Care Provider Office Phone # Fax #    Otis Murray [phone]       XXX RETIRED  [address] [facility] [address]        Equal Access to Services     PRASANTH AJ : Hadii porter ku hadasho Soomaali, waaxda luqadaha, qaybta kaalmada adeegyada, pricilla pérez. So [facility] [place] [facility] [phone].    ATENCIÓN: Si habla español, tiene a jimenez disposición servicios gratuitos de asistencia lingüística. Llmary ann al [phone].    We comply with applicable federal civil rights laws and [place] laws. We do not discriminate on the basis of race, color, national origin, age, disability sex, sexual orientation or gender identity.            Thank you!     Thank you for choosing [facility]  for your care. Our goal is always to provide you with excellent care. Hearing back from our patients is one way we can continue to improve our services. Please take a few minutes to complete the written survey that you may receive in the mail after your visit with us. Thank you!             Your Updated " Medication List - Protect others around you: Learn how to safely use, store and throw away your medicines at www.disposemymeds.org.          This list is accurate as of: 8/24/17  1:20 PM.  Always use your most recent med list.                   Brand Name Dispense Instructions for use Diagnosis    norgestim-eth estrad triphasic 0.18/0.215/0.25 MG-35 MCG per tablet    TRINESSA (28)    84 tablet    Take 1 tablet by mouth daily    Encounter for initial prescription of contraceptive pills       prenatal multivitamin plus iron 27-0.8 MG Tabs per tablet      Take 1 tablet by mouth daily        VITAMIN D (CHOLECALCIFEROL) PO      Take 400 Units by mouth daily

## 2017-08-24 NOTE — ED AVS SNAPSHOT
Emergency Department    [address] [facility] [address]    Phone:  [phone]    Fax:  [phone]                                       Nicolasa Moore   MRN: [id number]    Department:   Emergency Department   Date of Visit:  8/24/2017           After Visit Summary Signature Page     I have received my discharge instructions, and my questions have been answered. I have discussed any challenges I see with this plan with the nurse or doctor.    ..........................................................................................................................................  Patient/Patient Representative Signature      ..........................................................................................................................................  Patient Representative Print Name and Relationship to Patient    ..................................................               ................................................  Date                                            Time    ..........................................................................................................................................  Reviewed by Signature/Title    ...................................................              ..............................................  Date                                                            Time

## 2017-08-24 NOTE — PROGRESS NOTES
"  SUBJECTIVE:                                                      HPI: Nicolasa Moore is a pleasant 32 year old female who presents for follow-up:    Accompanied by .    Was seen in March for evaluation of palpitations and chest pain.    Had undergone a thorough evaluation prior to meeting with me.    Patient found to be pregnant at that visit - symptoms possibly attributable to early pregnancy.    Update since then:  - patient has been doing, generally, well since her last visit  - only had a couple mild episodes of palpitations and chest pain a few months ago  - this past Saturday, however, she had a severe episode, which lasted 20-30 minutes:   - intense left-sided chest pain and tightness   - shortness of breath   - nausea and vomiting   - shaking chills   - dizzy and lightheaded  - since then, she has...   - had continued, mild chest tightness   - had continued, mild shortness of breath   - been feeling very fatigued and \"floaty\"  - nausea and vomiting have resolved, but she did have diarrhea for a couple days afterwards    - patient also reports some mild right-sided calf pain   - associated redness or swelling    The medication, allergy, and problem lists have been reviewed and updated as appropriate.       OBJECTIVE:                                                      /78  Pulse 121  Temp 98.3  F (36.8  C) (Oral)  Ht 5' 6\" (1.676 m)  Wt 234 lb 1.6 oz (106.2 kg)  LMP 01/17/2017 (Approximate)  SpO2 98%  BMI 37.78 kg/m2  Constitutional: well-appearing  Respiratory: normal respiratory effort; inspiratory rub GRAYSNO, otherwise clear to auscultation bilaterally   Cardiovascular: tachycardic, but regular; no edema  Gastrointestinal: gravid   Psych: normal judgment and insight; normal mood and affect; recent and remote memory intact      ASSESSMENT/PLAN:                                                      (R07.89) Atypical chest pain  (primary encounter diagnosis)  (R00.2) " Palpitations  (R06.02) Shortness of breath  Comment:    - etiology unclear.   - differential includes arrhythmia, pleuritis, pericarditis, PE, acid reflux, and gastroenteritis.   - differential also includes anxiety attack.  Plan:    - EKG today.   - CBC, CMP, and TSH reflex today.   - if above unrevealing, consider 7, 14, or 30 day Holter monitor.    The instructions on the AVS were discussed and explained to the patient. Patient expressed understanding of instructions.    A total of 25 minutes were spent face-to-face with this patient during this encounter and over half of that time was spent on counseling and coordination of care re: above diagnoses and plans of care.     (Chart documentation was completed, in part, with [employer] voice-recognition software. Even though reviewed, some grammatical, spelling, and word errors may remain.)    Nyasia Smith MD   [address] [address]  T: [phone], F: [phone]

## 2017-08-24 NOTE — NURSING NOTE
"Chief Complaint   Patient presents with     Palpitations       Initial /78  Pulse 121  Temp 98.3  F (36.8  C) (Oral)  Ht 5' 6\" (1.676 m)  Wt 234 lb 1.6 oz (106.2 kg)  LMP 01/17/2017 (Approximate)  SpO2 98%  BMI 37.78 kg/m2 Estimated body mass index is 37.78 kg/(m^2) as calculated from the following:    Height as of this encounter: 5' 6\" (1.676 m).    Weight as of this encounter: 234 lb 1.6 oz (106.2 kg).  Medication Reconciliation: complete   Jessica Macdonald MA   "

## 2017-08-24 NOTE — TELEPHONE ENCOUNTER
EKG ? Suggestive of PE (no S1, but + Q3T3).     DDimer elevated.    Patient directed to ER.    Discussed case with [facility] ER attending [place].

## 2017-08-24 NOTE — ED PROVIDER NOTES
"  History     Chief Complaint:  Chest Pain and Leg Pain    HPI   Nicolasa Moore is a  32 year old female currently 31 weeks pregnant who presents to the emergency department today for evaluation of chest pain and leg pain. The patient reports that in February, before she knew she was pregnant, she was evaluated at [facility] for an episode of chest pain. She states that she had an ECG which revealed an arrhythmia as well as a chest x-ray, and was then discharged for out-patient cardiology follow up. She reports that she was evaluated by Dr. Smith at the [facility] and had a normal work up and a positive pregnancy test. The patient reports that on Saturday she had a similar episode of chest pain that occurred when she was driving and radiated down her left arm and into her left upper back, with chills, dizziness, disorientation, and a half hour of vomiting. She notes that on Monday, Tuesday, and Wednesday of this week she had a little pain in her calf, and went to see Dr. Smith today for evaluation, who performed an ECG and lab work and referred her to the emergency department for evaluation for potential deep vein thrombosis. Other symptoms noted by the patient include left sided neck pain, left upper quadrant abdominal pain, intermittent eye twitching since Saturday, a feeling of \"floating\" and lightheadedness since Saturday, some pain with deep breaths, and a slight cough. She denies recent trips or travel or trauma to her legs. She notes that she had no complications with her first pregnancy.    Allergies:  Penicillins     Medications:    Prenatal vitamins  Norgestim-eth estrad triphasic    Past Medical History:    Acute chest pain  Anemia  Palpitations    Past Surgical History:     section    Family History:    Maternal Grandmother: Hypertension  Paternal Grandfather: Prostate Cancer    Social History:    Smoking Status: Never Smoker  Smokeless Tobacco: Never Used  Alcohol Use: " "Negative  Marital Status:       Review of Systems   Constitutional: Positive for chills.   HENT:        Positive for eye twitching.   Respiratory: Positive for cough.         Positive for pain with deep breaths.   Cardiovascular: Positive for chest pain.   Gastrointestinal: Positive for abdominal pain (left upper quadrant) and vomiting.   Musculoskeletal: Positive for back pain (left upper) and neck pain (left).        Positive for pain in left arm. Positive for calf pain.   Neurological: Positive for dizziness and light-headedness.   Psychiatric/Behavioral:        Positive for disorientation.   All other systems reviewed and are negative.    Physical Exam     Vitals:  Patient Vitals for the past 24 hrs:   BP Temp Temp src Pulse Heart Rate Resp SpO2 Height Weight   08/24/17 1842 108/61 - - 96 96 - 97 % - -   08/24/17 1841 108/61 - - - - - 98 % - -   08/24/17 1700 121/80 - - 124 124 18 98 % - -   08/24/17 1535 118/75 98.1  F (36.7  C) Oral 129 - - - - -   08/24/17 1508 139/81 98.1  F (36.7  C) Oral - 131 18 99 % 1.676 m (5' 6\") 106.4 kg (234 lb 9.6 oz)       Physical Exam  GENERAL: well developed, pleasant  HEAD: atraumatic  EYES: pupils reactive, extraocular muscles intact, conjunctivae normal  ENT:  mucus membranes moist  NECK:  trachea midline, normal range of motion  RESPIRATORY: no tachypnea, breath sounds clear to auscultation   CVS: Tachycardic, normal S1/S2, no murmurs, intact distal pulses  ABDOMEN: Gravid abdomen, soft, nontender, nondistention  MUSCULOSKELETAL: no deformities  SKIN: warm and dry, no acute rashes or ulceration  NEURO: GCS 15, cranial nerves intact, alert and oriented x3  PSYCH:  Mood/affect normal    Emergency Department Course     ECG:  ECG taken at 1516, ECG read at 1530  Sinus tachycardia  T wave abnormality, consider inferior ischemia  Abnormal ECG  Rate 140 bpm. IA interval 128 ms. QRS duration 74 ms. QT/QTc 288/439 ms. P-R-T axes 32 36 -11.    Repeat ECG:  ECG taken at 1849, " ECG read at 1856  Sinus tachycardia  Cannot rule out Anterior infarct, age undetermined  Abnormal ECG  Rate 104 bpm. NC interval 134 ms. QRS duration 74 ms. QT/QTc 314/412. P-R-T axes 30 14 8.    Imaging:  Radiology findings were communicated with the patient who voiced understanding of the findings.    Chest CT, IV contrast only - PE protocol  1. No evidence for pulmonary embolism.  2. Curvature thoracic spine convex to the right.  3. Otherwise unremarkable CT chest and no change since the prior exam.  Reading per radiology    Laboratory:  Laboratory findings were communicated with the patient who voiced understanding of the findings.    Troponin (Collected 1553): <0.015    Interventions:  1555 NS 1000 mLs IV    Emergency Department Course:  Nursing notes and vitals reviewed.  I performed an exam of the patient as documented above.   IV was inserted and blood was drawn for laboratory testing, results above.  The patient was sent for a Chest CT, IV contrast only - PE protocol while in the emergency department, results above.   1902 I spoke with OB/GYN regarding patient's presentation, findings, and plan of care.  1943 I spoke with Dr. Gibbs of the cardiology service from [facility] regarding patient's presentation, findings, and plan of care.  I discussed the treatment plan with the patient. They expressed understanding of this plan and consented to discharge. They will be discharged home with instructions for care and follow up. In addition, the patient will return to the emergency department if their symptoms persist, worsen, if new symptoms arise or if there is any concern.  All questions were answered.  I personally reviewed the ECG, imaging, and laboratory results with the patient and answered all related questions prior to discharge.    Impression & Plan      Medical Decision Making:  Patient presents with chest pain and palpitations. Patient is pregnant and was sent over due to an elevated d dimer. CT  chest was negative for PE, troponin is normal, no mention of effusion or cardiomyopathy in terms of cardiomegaly on the CT. She had been symptomatic for the last three days. Patient was tachycardic in the 140's and it looked to be a sinus tachycardia. She was given a liter of fluids and now it is 100. She notes with minimal exertion she feels her heart racing. Did speak with OB and cardiology for out-patient management. Will get her set up for an echo and have her follow up with OB as well as her primary.    Diagnosis:    ICD-10-CM    1. Tachycardia R00.0 Echocardiogram   2. Acute chest pain R07.9 Echocardiogram     Plan:  Patient does not have any evidence of preeclampsia, certainly pregnancy induced cardiomyopathy is considered. Will continue out-patient work up.    Disposition:  The patient is discharged to home.  Scribe Disclosure:  Derrick TUCKER, am serving as a scribe at 3:19 PM on 8/24/2017 to document services personally performed by Riley Sheikh MD based on my observations and the provider's statements to me.   EMERGENCY DEPARTMENT       Riley Sheikh MD  08/25/17 0002

## 2017-08-25 NOTE — TELEPHONE ENCOUNTER
Discussed with patient.    Agree with plans.     If pain worsens can consider course of oral steroids or norco as needed.

## 2017-08-25 NOTE — PROGRESS NOTES
Contacted by ED: 33 yo pregnant woman seen for rapid HR.  EKG consistent with sinus tachycardia.  No chest pain and normal troponin. HR has decreased to < 100 BPM, BP now normal - was upper limit normal.  Sinus tachycardia is the heart's appropriate response to a stimulus.  Do not believe this is an arrhythmic problem though she could have additional tachyarrhythmia not seen in ED.   From cardiac standpoint, recommend echo to R/O peripartum cardiomyopathy. Would defer to other services for other possible etiologies for the symptoms.

## 2017-08-25 NOTE — TELEPHONE ENCOUNTER
Patient asked if you could review results from ER and let her know if there is anything else she needs to do.    Patient is set up for Echo 09/06/2017 @ 8am    Patient will call to make follow up appt for OB and PCP

## 2017-08-30 ENCOUNTER — TELEPHONE (OUTPATIENT)
Dept: NURSING | Facility: CLINIC | Age: 32
End: 2017-08-30

## 2017-08-30 ENCOUNTER — HOSPITAL ENCOUNTER (OUTPATIENT)
Dept: CARDIOLOGY | Facility: CLINIC | Age: 32
Discharge: HOME | End: 2017-08-30
Attending: INTERNAL MEDICINE | Admitting: INTERNAL MEDICINE
Payer: COMMERCIAL

## 2017-08-30 DIAGNOSIS — R07.89 ATYPICAL CHEST PAIN: ICD-10-CM

## 2017-08-30 DIAGNOSIS — R07.89 ATYPICAL CHEST PAIN: Primary | ICD-10-CM

## 2017-08-30 PROCEDURE — 93306 TTE W/DOPPLER COMPLETE: CPT

## 2017-08-30 PROCEDURE — 93306 TTE W/DOPPLER COMPLETE: CPT | Mod: 26 | Performed by: INTERNAL MEDICINE

## 2017-08-30 NOTE — TELEPHONE ENCOUNTER
Patient thought her ECHO was today but it is next week. Looks like ordered by ED MD. She is very worried and wants to have it done today. Said was sent to ED previously by Dr. Smith. She is 7 months pregnant. Having persistent tightness in chest to left area and back of shoulder blade. Feels like someone is pushing needle into shoulder blade. Short of breath. Tired. Nothing is worse or new since being in the ED, maybe improved slightly. Seeing OB tomorrow. Looks like she was instructed to f/u with Dr. Smith, f/u with OB in 1 day, and have ECHO per ER d/c instructions from 8/24/17.

## 2017-08-30 NOTE — TELEPHONE ENCOUNTER
Appointment made at [facility] for 10:30 AM today. Patient informed. Cancelled ECHO for next week. Route results to Dr. Smith when available.

## 2017-10-17 PROBLEM — O34.219 PREVIOUS CESAREAN SECTION COMPLICATING PREGNANCY, ANTEPARTUM CONDITION OR COMPLICATION: Status: ACTIVE | Noted: 2017-10-17

## 2017-10-18 NOTE — H&P
HISTORY OF PRESENT ILLNESS:  Nicolasa Moore is a 32-year-old  2, para 1-0-0-1 who will be 39 weeks 1 day gestation on Friday, 10/20/2017.  The patient has a history of a prior  section.  She desires a repeat.  She declines tubal ligation for permanent sterilization at the time of repeat  section.  I feel the patient is well informed regarding her options regarding a repeat  section versus a trial of labor after  section.  We went over the risks, benefits, pros and cons including risks of surgery (bleeding, infection, injury to other organs).  She desires to proceed with a repeat  section once greater than 39 weeks.      The patient's estimated gestational age is by first trimester ultrasound with estimated date of confinement 10/26/2017.  The patient's pregnancy as previously stated was complicated by a previous  section.  She desires a repeat.  She does note a history of polycystic ovarian syndrome and she did get pregnant while taking birth control pills.  Patient is overweight.  She did have an early 1-hour glucose challenge test which was normal at 123.  She did fail her 1-hour glucose challenge test at 28 weeks but passed the 3-hour glucose tolerance test.  She did have  test which returned normal/negative.  She also had normal spinal muscular atrophy, fragile X and cystic fibrosis testing.  She is Rh positive and rubella immune.      Overall, the patient's pregnancy was unremarkable.  She did well.  She is group B strep negative.      PAST OBSTETRICAL HISTORY:  2015 at 39 weeks 4 days gestation, she proceeded with cervical ripening/induction of labor secondary to persistent decreased fetal movement.  Maternal Fetal Medicine recommended induction of labor.  She had cervical ripening with Cervidil.  She had episodes of fetal bradycardia and failure to dilate and meconium-stained fluid.  She proceeded to have a  section and delivered a  baby girl weighing 6 pounds 3.6 ounces.      GYNECOLOGIC HISTORY:  The patient with history of polycystic ovarian syndrome.  Pap smear history is normal and negative.  She denies history of STDs.      PAST MEDICAL HISTORY:  None.      PAST SURGICAL HISTORY:   section on 2015.      MEDICATIONS:   1.  Prenatal vitamins.   2.  Folic acid.      ALLERGIES:  Penicillin which causes a rash.      SOCIAL HISTORY:  The patient is .  She and her  live in [place], [place].  The patient is employed as a .  She denies tobacco, alcohol or illicit drug use.      FAMILY HISTORY:  Hypertension involving her father.      PHYSICAL EXAMINATION:   VITAL SIGNS:  Blood pressure 118/72.     Weight 236 pounds, height 5 feet 6-1/4 inches.   GENERAL:  The patient is alert and oriented x3, no apparent distress.   HEART:  S1, S2, regular rate and rhythm.   LUNGS:  Clear to auscultation bilaterally, no wheezes, rales or rhonchi.   BACK:  No costovertebral angle tenderness.   ABDOMEN:  Soft, gravid, nontender.   PELVIC:  Cervix is fingertip, 50%, -3 station.   EXTREMITIES:  No cyanosis, clubbing or edema, nontender.      ASSESSMENT:   1.  A 32-year-old  2, para 1-0-0-1 who will be 39 weeks 1 day gestation on 10/20/2017.   2.  Previous  section, desires repeat.   3.  Declines permanent sterilization at this time.   4.  Group B strep negative.      PLAN:  The patient will proceed to operating room on 10/20/2017 for an elective repeat  section.         VIPIN ADAMES MD             D: 10/17/2017 17:18   T: 10/17/2017 17:58   MT:       Name:     DANIELE WILSON   MRN:      [id number]        Account:      [id number]   :      1985           Admitted:     [id number]      Document: [id number]

## 2017-10-20 ENCOUNTER — ANESTHESIA EVENT (OUTPATIENT)
Dept: SURGERY | Facility: CLINIC | Age: 32
End: 2017-10-20
Payer: COMMERCIAL

## 2017-10-20 ENCOUNTER — ANESTHESIA (OUTPATIENT)
Dept: SURGERY | Facility: CLINIC | Age: 32
End: 2017-10-20
Payer: COMMERCIAL

## 2017-10-20 ENCOUNTER — SURGERY (OUTPATIENT)
Age: 32
End: 2017-10-20

## 2017-10-20 ENCOUNTER — HOSPITAL ENCOUNTER (INPATIENT)
Facility: CLINIC | Age: 32
LOS: 3 days | Discharge: HOME | End: 2017-10-23
Attending: OBSTETRICS & GYNECOLOGY | Admitting: OBSTETRICS & GYNECOLOGY
Payer: COMMERCIAL

## 2017-10-20 DIAGNOSIS — D62 ANEMIA DUE TO BLOOD LOSS, ACUTE: ICD-10-CM

## 2017-10-20 DIAGNOSIS — Z98.891 STATUS POST REPEAT LOW TRANSVERSE CESAREAN SECTION: Primary | ICD-10-CM

## 2017-10-20 DIAGNOSIS — O34.219 PREVIOUS CESAREAN SECTION COMPLICATING PREGNANCY, ANTEPARTUM CONDITION OR COMPLICATION: ICD-10-CM

## 2017-10-20 LAB
ABO + RH BLD: NORMAL
ABO + RH BLD: NORMAL
BLD GP AB SCN SERPL QL: NORMAL
BLOOD BANK CMNT PATIENT-IMP: NORMAL
HGB BLD-MCNC: 11.4 G/DL (ref 11.7–15.7)
SPECIMEN EXP DATE BLD: NORMAL

## 2017-10-20 PROCEDURE — 25000128 H RX IP 250 OP 636: Performed by: OBSTETRICS & GYNECOLOGY

## 2017-10-20 PROCEDURE — 25000128 H RX IP 250 OP 636

## 2017-10-20 PROCEDURE — 40000169 ZZH STATISTIC PRE-PROCEDURE ASSESSMENT I: Performed by: OBSTETRICS & GYNECOLOGY

## 2017-10-20 PROCEDURE — 25000125 ZZHC RX 250: Performed by: NURSE ANESTHETIST, CERTIFIED REGISTERED

## 2017-10-20 PROCEDURE — 27210794 ZZH OR GENERAL SUPPLY STERILE: Performed by: OBSTETRICS & GYNECOLOGY

## 2017-10-20 PROCEDURE — 25000132 ZZH RX MED GY IP 250 OP 250 PS 637

## 2017-10-20 PROCEDURE — 71000012 ZZH RECOVERY PHASE 1 LEVEL 1 FIRST HR: Performed by: OBSTETRICS & GYNECOLOGY

## 2017-10-20 PROCEDURE — 36415 COLL VENOUS BLD VENIPUNCTURE: CPT | Performed by: OBSTETRICS & GYNECOLOGY

## 2017-10-20 PROCEDURE — 36000058 ZZH SURGERY LEVEL 3 EA 15 ADDTL MIN: Performed by: OBSTETRICS & GYNECOLOGY

## 2017-10-20 PROCEDURE — 86780 TREPONEMA PALLIDUM: CPT | Performed by: OBSTETRICS & GYNECOLOGY

## 2017-10-20 PROCEDURE — 37000009 ZZH ANESTHESIA TECHNICAL FEE, EACH ADDTL 15 MIN: Performed by: OBSTETRICS & GYNECOLOGY

## 2017-10-20 PROCEDURE — 36000056 ZZH SURGERY LEVEL 3 1ST 30 MIN: Performed by: OBSTETRICS & GYNECOLOGY

## 2017-10-20 PROCEDURE — 25000132 ZZH RX MED GY IP 250 OP 250 PS 637: Performed by: OBSTETRICS & GYNECOLOGY

## 2017-10-20 PROCEDURE — 85018 HEMOGLOBIN: CPT | Performed by: OBSTETRICS & GYNECOLOGY

## 2017-10-20 PROCEDURE — 40000010 ZZH STATISTIC ANES STAT CODE-CRNA PER MINUTE: Performed by: OBSTETRICS & GYNECOLOGY

## 2017-10-20 PROCEDURE — 37000008 ZZH ANESTHESIA TECHNICAL FEE, 1ST 30 MIN: Performed by: OBSTETRICS & GYNECOLOGY

## 2017-10-20 PROCEDURE — 86850 RBC ANTIBODY SCREEN: CPT | Performed by: OBSTETRICS & GYNECOLOGY

## 2017-10-20 PROCEDURE — 86900 BLOOD TYPING SEROLOGIC ABO: CPT | Performed by: OBSTETRICS & GYNECOLOGY

## 2017-10-20 PROCEDURE — 12000037 ZZH R&B POSTPARTUM INTERMEDIATE

## 2017-10-20 PROCEDURE — 25000125 ZZHC RX 250: Performed by: OBSTETRICS & GYNECOLOGY

## 2017-10-20 PROCEDURE — 86901 BLOOD TYPING SEROLOGIC RH(D): CPT | Performed by: OBSTETRICS & GYNECOLOGY

## 2017-10-20 PROCEDURE — 25000128 H RX IP 250 OP 636: Performed by: NURSE ANESTHETIST, CERTIFIED REGISTERED

## 2017-10-20 RX ORDER — LIDOCAINE 40 MG/G
CREAM TOPICAL
Status: DISCONTINUED | OUTPATIENT
Start: 2017-10-20 | End: 2017-10-20 | Stop reason: HOSPADM

## 2017-10-20 RX ORDER — ONDANSETRON 4 MG/1
4 TABLET, ORALLY DISINTEGRATING ORAL EVERY 6 HOURS PRN
Status: DISCONTINUED | OUTPATIENT
Start: 2017-10-20 | End: 2017-10-23 | Stop reason: HOSPADM

## 2017-10-20 RX ORDER — HYDROMORPHONE HYDROCHLORIDE 1 MG/ML
.3-.5 INJECTION, SOLUTION INTRAMUSCULAR; INTRAVENOUS; SUBCUTANEOUS EVERY 30 MIN PRN
Status: DISCONTINUED | OUTPATIENT
Start: 2017-10-20 | End: 2017-10-23 | Stop reason: HOSPADM

## 2017-10-20 RX ORDER — DIPHENHYDRAMINE HCL 25 MG
25 CAPSULE ORAL EVERY 6 HOURS PRN
Status: DISCONTINUED | OUTPATIENT
Start: 2017-10-20 | End: 2017-10-23 | Stop reason: HOSPADM

## 2017-10-20 RX ORDER — ONDANSETRON 2 MG/ML
INJECTION INTRAMUSCULAR; INTRAVENOUS
Status: COMPLETED
Start: 2017-10-20 | End: 2017-10-20

## 2017-10-20 RX ORDER — ONDANSETRON 2 MG/ML
4 INJECTION INTRAMUSCULAR; INTRAVENOUS EVERY 6 HOURS PRN
Status: DISCONTINUED | OUTPATIENT
Start: 2017-10-20 | End: 2017-10-23 | Stop reason: HOSPADM

## 2017-10-20 RX ORDER — NALBUPHINE HYDROCHLORIDE 10 MG/ML
2.5-5 INJECTION, SOLUTION INTRAMUSCULAR; INTRAVENOUS; SUBCUTANEOUS EVERY 6 HOURS PRN
Status: DISCONTINUED | OUTPATIENT
Start: 2017-10-20 | End: 2017-10-23 | Stop reason: HOSPADM

## 2017-10-20 RX ORDER — NALOXONE HYDROCHLORIDE 0.4 MG/ML
.1-.4 INJECTION, SOLUTION INTRAMUSCULAR; INTRAVENOUS; SUBCUTANEOUS
Status: DISCONTINUED | OUTPATIENT
Start: 2017-10-20 | End: 2017-10-23 | Stop reason: HOSPADM

## 2017-10-20 RX ORDER — KETOROLAC TROMETHAMINE 30 MG/ML
30 INJECTION, SOLUTION INTRAMUSCULAR; INTRAVENOUS EVERY 6 HOURS
Status: COMPLETED | OUTPATIENT
Start: 2017-10-20 | End: 2017-10-21

## 2017-10-20 RX ORDER — OXYTOCIN 10 [USP'U]/ML
10 INJECTION, SOLUTION INTRAMUSCULAR; INTRAVENOUS
Status: DISCONTINUED | OUTPATIENT
Start: 2017-10-20 | End: 2017-10-23 | Stop reason: HOSPADM

## 2017-10-20 RX ORDER — EPHEDRINE SULFATE 50 MG/ML
INJECTION, SOLUTION INTRAMUSCULAR; INTRAVENOUS; SUBCUTANEOUS PRN
Status: DISCONTINUED | OUTPATIENT
Start: 2017-10-20 | End: 2017-10-20

## 2017-10-20 RX ORDER — OXYCODONE HYDROCHLORIDE 5 MG/1
5-10 TABLET ORAL
Status: DISCONTINUED | OUTPATIENT
Start: 2017-10-20 | End: 2017-10-23 | Stop reason: HOSPADM

## 2017-10-20 RX ORDER — METOCLOPRAMIDE HYDROCHLORIDE 5 MG/ML
INJECTION INTRAMUSCULAR; INTRAVENOUS
Status: DISCONTINUED
Start: 2017-10-20 | End: 2017-10-20 | Stop reason: HOSPADM

## 2017-10-20 RX ORDER — DEXTROSE, SODIUM CHLORIDE, SODIUM LACTATE, POTASSIUM CHLORIDE, AND CALCIUM CHLORIDE 5; .6; .31; .03; .02 G/100ML; G/100ML; G/100ML; G/100ML; G/100ML
INJECTION, SOLUTION INTRAVENOUS CONTINUOUS
Status: DISCONTINUED | OUTPATIENT
Start: 2017-10-20 | End: 2017-10-21

## 2017-10-20 RX ORDER — LIDOCAINE 40 MG/G
CREAM TOPICAL
Status: DISCONTINUED | OUTPATIENT
Start: 2017-10-20 | End: 2017-10-23 | Stop reason: HOSPADM

## 2017-10-20 RX ORDER — METOCLOPRAMIDE HYDROCHLORIDE 5 MG/ML
10 INJECTION INTRAMUSCULAR; INTRAVENOUS EVERY 6 HOURS PRN
Status: DISCONTINUED | OUTPATIENT
Start: 2017-10-20 | End: 2017-10-23 | Stop reason: HOSPADM

## 2017-10-20 RX ORDER — METOCLOPRAMIDE HYDROCHLORIDE 5 MG/ML
INJECTION INTRAMUSCULAR; INTRAVENOUS PRN
Status: DISCONTINUED | OUTPATIENT
Start: 2017-10-20 | End: 2017-10-20

## 2017-10-20 RX ORDER — OXYTOCIN/0.9 % SODIUM CHLORIDE 30/500 ML
PLASTIC BAG, INJECTION (ML) INTRAVENOUS
Status: DISCONTINUED
Start: 2017-10-20 | End: 2017-10-20 | Stop reason: HOSPADM

## 2017-10-20 RX ORDER — SODIUM CHLORIDE, SODIUM LACTATE, POTASSIUM CHLORIDE, CALCIUM CHLORIDE 600; 310; 30; 20 MG/100ML; MG/100ML; MG/100ML; MG/100ML
INJECTION, SOLUTION INTRAVENOUS CONTINUOUS
Status: DISCONTINUED | OUTPATIENT
Start: 2017-10-20 | End: 2017-10-20 | Stop reason: HOSPADM

## 2017-10-20 RX ORDER — GLYCOPYRROLATE 0.2 MG/ML
INJECTION, SOLUTION INTRAMUSCULAR; INTRAVENOUS
Status: DISCONTINUED
Start: 2017-10-20 | End: 2017-10-20 | Stop reason: HOSPADM

## 2017-10-20 RX ORDER — BISACODYL 10 MG
10 SUPPOSITORY, RECTAL RECTAL DAILY PRN
Status: DISCONTINUED | OUTPATIENT
Start: 2017-10-22 | End: 2017-10-23 | Stop reason: HOSPADM

## 2017-10-20 RX ORDER — CITRIC ACID/SODIUM CITRATE 334-500MG
SOLUTION, ORAL ORAL
Status: COMPLETED
Start: 2017-10-20 | End: 2017-10-20

## 2017-10-20 RX ORDER — MORPHINE SULFATE 1 MG/ML
INJECTION, SOLUTION EPIDURAL; INTRATHECAL; INTRAVENOUS
Status: DISCONTINUED
Start: 2017-10-20 | End: 2017-10-20 | Stop reason: HOSPADM

## 2017-10-20 RX ORDER — MISOPROSTOL 200 UG/1
400 TABLET ORAL
Status: DISCONTINUED | OUTPATIENT
Start: 2017-10-20 | End: 2017-10-23 | Stop reason: HOSPADM

## 2017-10-20 RX ORDER — CEFAZOLIN SODIUM 2 G/100ML
2 INJECTION, SOLUTION INTRAVENOUS
Status: COMPLETED | OUTPATIENT
Start: 2017-10-20 | End: 2017-10-20

## 2017-10-20 RX ORDER — BUPIVACAINE HYDROCHLORIDE 7.5 MG/ML
INJECTION, SOLUTION INTRASPINAL PRN
Status: DISCONTINUED | OUTPATIENT
Start: 2017-10-20 | End: 2017-10-20

## 2017-10-20 RX ORDER — KETOROLAC TROMETHAMINE 30 MG/ML
INJECTION, SOLUTION INTRAMUSCULAR; INTRAVENOUS
Status: DISCONTINUED
Start: 2017-10-20 | End: 2017-10-20 | Stop reason: HOSPADM

## 2017-10-20 RX ORDER — OXYTOCIN/0.9 % SODIUM CHLORIDE 30/500 ML
100 PLASTIC BAG, INJECTION (ML) INTRAVENOUS CONTINUOUS
Status: DISCONTINUED | OUTPATIENT
Start: 2017-10-20 | End: 2017-10-21

## 2017-10-20 RX ORDER — FENTANYL CITRATE 50 UG/ML
INJECTION, SOLUTION INTRAMUSCULAR; INTRAVENOUS PRN
Status: DISCONTINUED | OUTPATIENT
Start: 2017-10-20 | End: 2017-10-20

## 2017-10-20 RX ORDER — KETOROLAC TROMETHAMINE 30 MG/ML
INJECTION, SOLUTION INTRAMUSCULAR; INTRAVENOUS
Status: COMPLETED
Start: 2017-10-20 | End: 2017-10-20

## 2017-10-20 RX ORDER — OXYTOCIN/0.9 % SODIUM CHLORIDE 30/500 ML
PLASTIC BAG, INJECTION (ML) INTRAVENOUS CONTINUOUS PRN
Status: DISCONTINUED | OUTPATIENT
Start: 2017-10-20 | End: 2017-10-20

## 2017-10-20 RX ORDER — SIMETHICONE 80 MG
80 TABLET,CHEWABLE ORAL 4 TIMES DAILY PRN
Status: DISCONTINUED | OUTPATIENT
Start: 2017-10-20 | End: 2017-10-23 | Stop reason: HOSPADM

## 2017-10-20 RX ORDER — ONDANSETRON 2 MG/ML
INJECTION INTRAMUSCULAR; INTRAVENOUS PRN
Status: DISCONTINUED | OUTPATIENT
Start: 2017-10-20 | End: 2017-10-20

## 2017-10-20 RX ORDER — FENTANYL CITRATE 50 UG/ML
INJECTION, SOLUTION INTRAMUSCULAR; INTRAVENOUS
Status: DISCONTINUED
Start: 2017-10-20 | End: 2017-10-20 | Stop reason: HOSPADM

## 2017-10-20 RX ORDER — ACETAMINOPHEN 325 MG/1
650 TABLET ORAL EVERY 4 HOURS PRN
Status: DISCONTINUED | OUTPATIENT
Start: 2017-10-23 | End: 2017-10-23 | Stop reason: HOSPADM

## 2017-10-20 RX ORDER — DIPHENHYDRAMINE HYDROCHLORIDE 50 MG/ML
25 INJECTION INTRAMUSCULAR; INTRAVENOUS EVERY 6 HOURS PRN
Status: DISCONTINUED | OUTPATIENT
Start: 2017-10-20 | End: 2017-10-23 | Stop reason: HOSPADM

## 2017-10-20 RX ORDER — CEFAZOLIN SODIUM 2 G/100ML
INJECTION, SOLUTION INTRAVENOUS
Status: DISCONTINUED
Start: 2017-10-20 | End: 2017-10-20 | Stop reason: HOSPADM

## 2017-10-20 RX ORDER — MORPHINE SULFATE 1 MG/ML
0.15 INJECTION, SOLUTION EPIDURAL; INTRATHECAL; INTRAVENOUS ONCE
Status: DISCONTINUED | OUTPATIENT
Start: 2017-10-20 | End: 2017-10-20

## 2017-10-20 RX ORDER — MORPHINE SULFATE 1 MG/ML
INJECTION, SOLUTION EPIDURAL; INTRATHECAL; INTRAVENOUS PRN
Status: DISCONTINUED | OUTPATIENT
Start: 2017-10-20 | End: 2017-10-20

## 2017-10-20 RX ORDER — CEFAZOLIN SODIUM 1 G/3ML
1 INJECTION, POWDER, FOR SOLUTION INTRAMUSCULAR; INTRAVENOUS SEE ADMIN INSTRUCTIONS
Status: DISCONTINUED | OUTPATIENT
Start: 2017-10-20 | End: 2017-10-20 | Stop reason: HOSPADM

## 2017-10-20 RX ORDER — EPHEDRINE SULFATE 50 MG/ML
5 INJECTION, SOLUTION INTRAMUSCULAR; INTRAVENOUS; SUBCUTANEOUS
Status: DISCONTINUED | OUTPATIENT
Start: 2017-10-20 | End: 2017-10-23 | Stop reason: HOSPADM

## 2017-10-20 RX ORDER — AMOXICILLIN 250 MG
1-2 CAPSULE ORAL 2 TIMES DAILY
Status: DISCONTINUED | OUTPATIENT
Start: 2017-10-20 | End: 2017-10-23 | Stop reason: HOSPADM

## 2017-10-20 RX ORDER — IBUPROFEN 400 MG/1
400-800 TABLET, FILM COATED ORAL EVERY 6 HOURS PRN
Status: DISCONTINUED | OUTPATIENT
Start: 2017-10-20 | End: 2017-10-23 | Stop reason: HOSPADM

## 2017-10-20 RX ORDER — FENTANYL CITRATE 50 UG/ML
15 INJECTION, SOLUTION INTRAMUSCULAR; INTRAVENOUS ONCE
Status: DISCONTINUED | OUTPATIENT
Start: 2017-10-20 | End: 2017-10-20

## 2017-10-20 RX ORDER — ACETAMINOPHEN 325 MG/1
975 TABLET ORAL EVERY 8 HOURS
Status: DISPENSED | OUTPATIENT
Start: 2017-10-20 | End: 2017-10-23

## 2017-10-20 RX ORDER — CITRIC ACID/SODIUM CITRATE 334-500MG
30 SOLUTION, ORAL ORAL
Status: COMPLETED | OUTPATIENT
Start: 2017-10-20 | End: 2017-10-20

## 2017-10-20 RX ORDER — OXYTOCIN/0.9 % SODIUM CHLORIDE 30/500 ML
340 PLASTIC BAG, INJECTION (ML) INTRAVENOUS CONTINUOUS PRN
Status: DISCONTINUED | OUTPATIENT
Start: 2017-10-20 | End: 2017-10-23 | Stop reason: HOSPADM

## 2017-10-20 RX ORDER — HYDROCORTISONE 2.5 %
CREAM (GRAM) TOPICAL 3 TIMES DAILY PRN
Status: DISCONTINUED | OUTPATIENT
Start: 2017-10-20 | End: 2017-10-23 | Stop reason: HOSPADM

## 2017-10-20 RX ORDER — LANOLIN 100 %
OINTMENT (GRAM) TOPICAL
Status: DISCONTINUED | OUTPATIENT
Start: 2017-10-20 | End: 2017-10-23 | Stop reason: HOSPADM

## 2017-10-20 RX ADMIN — KETOROLAC TROMETHAMINE 30 MG: 30 INJECTION, SOLUTION INTRAMUSCULAR at 22:37

## 2017-10-20 RX ADMIN — SODIUM CITRATE AND CITRIC ACID MONOHYDRATE 30 ML: 500; 334 SOLUTION ORAL at 08:48

## 2017-10-20 RX ADMIN — PHENYLEPHRINE HYDROCHLORIDE 150 MCG: 10 INJECTION INTRAVENOUS at 10:54

## 2017-10-20 RX ADMIN — MORPHINE SULFATE 0.15 MG: 1 INJECTION EPIDURAL; INTRATHECAL; INTRAVENOUS at 09:58

## 2017-10-20 RX ADMIN — SODIUM CHLORIDE, POTASSIUM CHLORIDE, SODIUM LACTATE AND CALCIUM CHLORIDE: 600; 310; 30; 20 INJECTION, SOLUTION INTRAVENOUS at 11:07

## 2017-10-20 RX ADMIN — PHENYLEPHRINE HYDROCHLORIDE 100 MCG: 10 INJECTION INTRAVENOUS at 10:01

## 2017-10-20 RX ADMIN — METOCLOPRAMIDE HYDROCHLORIDE 10 MG: 5 INJECTION INTRAMUSCULAR; INTRAVENOUS at 10:41

## 2017-10-20 RX ADMIN — PHENYLEPHRINE HYDROCHLORIDE 100 MCG: 10 INJECTION INTRAVENOUS at 10:12

## 2017-10-20 RX ADMIN — SODIUM CHLORIDE, POTASSIUM CHLORIDE, SODIUM LACTATE AND CALCIUM CHLORIDE: 600; 310; 30; 20 INJECTION, SOLUTION INTRAVENOUS at 10:15

## 2017-10-20 RX ADMIN — PHENYLEPHRINE HYDROCHLORIDE 100 MCG: 10 INJECTION INTRAVENOUS at 10:06

## 2017-10-20 RX ADMIN — Medication 5 MG: at 10:14

## 2017-10-20 RX ADMIN — PHENYLEPHRINE HYDROCHLORIDE 100 MCG: 10 INJECTION INTRAVENOUS at 10:40

## 2017-10-20 RX ADMIN — Medication 340 ML/HR: at 10:23

## 2017-10-20 RX ADMIN — FENTANYL CITRATE 15 MCG: 50 INJECTION, SOLUTION INTRAMUSCULAR; INTRAVENOUS at 09:58

## 2017-10-20 RX ADMIN — PHENYLEPHRINE HYDROCHLORIDE 100 MCG: 10 INJECTION INTRAVENOUS at 10:15

## 2017-10-20 RX ADMIN — PHENYLEPHRINE HYDROCHLORIDE 100 MCG: 10 INJECTION INTRAVENOUS at 10:37

## 2017-10-20 RX ADMIN — PHENYLEPHRINE HYDROCHLORIDE 150 MCG: 10 INJECTION INTRAVENOUS at 10:48

## 2017-10-20 RX ADMIN — PHENYLEPHRINE HYDROCHLORIDE 150 MCG: 10 INJECTION INTRAVENOUS at 10:43

## 2017-10-20 RX ADMIN — OXYTOCIN-SODIUM CHLORIDE 0.9% IV SOLN 30 UNIT/500ML 100 ML/HR: 30-0.9/5 SOLUTION at 11:26

## 2017-10-20 RX ADMIN — Medication 30 ML: at 08:48

## 2017-10-20 RX ADMIN — CEFAZOLIN SODIUM 2 G: 2 INJECTION, SOLUTION INTRAVENOUS at 10:00

## 2017-10-20 RX ADMIN — PHENYLEPHRINE HYDROCHLORIDE 100 MCG: 10 INJECTION INTRAVENOUS at 10:26

## 2017-10-20 RX ADMIN — SODIUM CHLORIDE, POTASSIUM CHLORIDE, SODIUM LACTATE AND CALCIUM CHLORIDE: 600; 310; 30; 20 INJECTION, SOLUTION INTRAVENOUS at 08:44

## 2017-10-20 RX ADMIN — PHENYLEPHRINE HYDROCHLORIDE 150 MCG: 10 INJECTION INTRAVENOUS at 10:18

## 2017-10-20 RX ADMIN — ONDANSETRON 4 MG: 2 INJECTION INTRAMUSCULAR; INTRAVENOUS at 09:38

## 2017-10-20 RX ADMIN — SODIUM CHLORIDE, POTASSIUM CHLORIDE, SODIUM LACTATE AND CALCIUM CHLORIDE 1000 ML: 600; 310; 30; 20 INJECTION, SOLUTION INTRAVENOUS at 08:00

## 2017-10-20 RX ADMIN — PHENYLEPHRINE HYDROCHLORIDE 100 MCG: 10 INJECTION INTRAVENOUS at 10:39

## 2017-10-20 RX ADMIN — KETOROLAC TROMETHAMINE 30 MG: 30 INJECTION, SOLUTION INTRAMUSCULAR at 11:39

## 2017-10-20 RX ADMIN — OXYTOCIN-SODIUM CHLORIDE 0.9% IV SOLN 30 UNIT/500ML 100 ML/HR: 30-0.9/5 SOLUTION at 14:38

## 2017-10-20 RX ADMIN — PHENYLEPHRINE HYDROCHLORIDE 150 MCG: 10 INJECTION INTRAVENOUS at 10:14

## 2017-10-20 RX ADMIN — SODIUM CHLORIDE, SODIUM LACTATE, POTASSIUM CHLORIDE, CALCIUM CHLORIDE, AND DEXTROSE MONOHYDRATE: 600; 310; 30; 20; 5 INJECTION, SOLUTION INTRAVENOUS at 19:23

## 2017-10-20 RX ADMIN — ACETAMINOPHEN 975 MG: 325 TABLET, FILM COATED ORAL at 19:20

## 2017-10-20 RX ADMIN — KETOROLAC TROMETHAMINE 30 MG: 30 INJECTION, SOLUTION INTRAMUSCULAR at 17:10

## 2017-10-20 RX ADMIN — PHENYLEPHRINE HYDROCHLORIDE 150 MCG: 10 INJECTION INTRAVENOUS at 10:32

## 2017-10-20 RX ADMIN — BUPIVACAINE HYDROCHLORIDE IN DEXTROSE 12 MG: 7.5 INJECTION, SOLUTION SUBARACHNOID at 09:58

## 2017-10-20 NOTE — IP AVS SNAPSHOT
MRN:[id number]                      After Visit Summary   10/20/2017    Nicolasa Moore    MRN: [id number]           Thank you!     Thank you for choosing [facility] for your care. Our goal is always to provide you with excellent care. Hearing back from our patients is one way we can continue to improve our services. Please take a few minutes to complete the written survey that you may receive in the mail after you visit with us. Thank you!        Patient Information     Date Of Birth          1985        About your hospital stay     You were admitted on:  October 20, 2017 You last received care in the:  [address]    You were discharged on:  October 23, 2017        Reason for your hospital stay       Maternity care                  Who to Call     For medical emergencies, please call 911.  For non-urgent questions about your medical care, please call your primary care provider or clinic, [phone]  For questions related to your surgery, please call your surgery clinic        Attending Provider     Provider Specialty    Phan Bill MD OB/Gyn       Primary Care Provider Office Phone # Fax #    Nyasia Smith [phone]      After Care Instructions     Activity       Review discharge instructions            Diet       Resume previous diet            Discharge Instructions - Gestational diabetic patients       Gestational diabetic patients to follow up for fasting blood sugar and 2 hour 75gm glucose load at 6 weeks postpartum.            Discharge Instructions - Hypertensive disorders patients       High Blood pressure patients to follow up in clinic or via home care within one week for a blood pressure check            Discharge Instructions - Postpartum visit       Schedule postpartum visit with your provider and return to clinic in 6 weeks.                  Follow-up Appointments     Follow Up and recommended labs and tests        Follow up with Dr. Bill , at (location with clinic name or city) [facility]'s [place], within 6 weeks  Routine postpartum check. No follow up labs or test are needed.                  Further instructions from your care team       Postop  Birth Instructions    Activity       Do not lift more than 10 pounds for 6 weeks after surgery.  Ask family and friends for help when you need it.    No driving until you have stopped taking your pain medications (usually two weeks after surgery).    No heavy exercise or activity for 6 weeks.  Don't do anything that will put a strain on your surgery site.    Don't strain when using the toilet.  Your care team may prescribe a stool softener if you have problems with your bowel movements.     To care for your incision:       Keep the incision clean and dry.    Do not soak your incision in water. No swimming or hot tubs until it has fully healed. You may soak in the bathtub if the water level is below your incision.    Do not use peroxide, gel, cream, lotion, or ointment on your incision.    Adjust your clothes to avoid pressure on your surgery site (check the elastic in your underwear for example).     You may see a small amount of clear or pink drainage and this is normal.  Check with your health care provider:       If the drainage increases or has an odor.    If the incision reddens, you have swelling, or develop a rash.    If you have increased pain and the medicine we prescribed doesn't help.    If you have a fever above 100.4 F (38 C) with or without chills when placing thermometer under your tongue.   The area around your incision (surgery wound), will feel numb.  This is normal. The numbness should go away in less than a year.     Keep your hands clean:  Always wash your hands before touching your incision (surgery wound). This helps reduce your risk of infection. If your hands aren't dirty, you may use an alcohol hand-rub to clean your hands. Keep your nails  clean and short.    Call your healthcare provider if you have any of these symptoms:       You soak a sanitary pad with blood within 1 hour, or you see blood clots larger than a golf ball.    Bleeding that lasts more than 6 weeks.    Vaginal discharge that smells bad.    Severe pain, cramping or tenderness in your lower belly area.    A need to urinate more frequently (use the toilet more often), more urgently (use the toilet very quickly), or it burns when you urinate.    Nausea and vomiting.    Redness, swelling or pain around a vein in your leg.    Problems breastfeeding or a red or painful area on your breast.    Chest pain and cough or are gasping for air.    Problems with coping with sadness, anxiety or depression. If you have concerns about hurting yourself or the baby, call your provider immediately.      You have questions or concerns after you return home.                  Pending Results     No orders found from 10/18/2017 to 10/21/2017.            Statement of Approval     Ordered          10/23/17 0934  I have reviewed and agree with all the recommendations and orders detailed in this document.  EFFECTIVE NOW     Approved and electronically signed by:  Zora Miranda MD             Admission Information     Date & Time Provider Department Dept. Phone    10/20/2017 Phan Bill MD [address] [phone]      Your Vitals Were     Blood Pressure Pulse Temperature Respirations Weight Last Period    107/60 (BP Location: Right arm) 80 98.4  F (36.9  C) (Oral) 16 108.9 kg (240 lb) 01/17/2017 (Approximate)    Pulse Oximetry BMI (Body Mass Index)                100% 38.74 kg/m2          MyChart Information     Powered by [employer] gives you secure access to your electronic health record. If you see a primary care provider, you can also send messages to your care team and make appointments. If you have questions, please call your primary care clinic.  If you do not have a primary care  provider, please call [phone] and they will assist you.        Care EveryWhere ID     This is your Care EveryWhere ID. This could be used by other organizations to access your [place] medical records  [id number]        Equal Access to Services     PRASANTH AJ : Hadii aad ku hadgustavosandrine Yael, waaxda luqadaha, qaybta kaalmada adekeiry, pricilla haliin hayaacoral kwokhossein esquivel melody pérez. So [facility] [phone].    ATENCIÓN: Si habla español, tiene a jimenez disposición servicios gratuitos de asistencia lingüística. Llame al [phone].    We comply with applicable federal civil rights laws and [place] laws. We do not discriminate on the basis of race, color, national origin, age, disability, sex, sexual orientation, or gender identity.               Review of your medicines      START taking        Dose / Directions    ibuprofen 600 MG tablet   Commonly known as:  ADVIL/MOTRIN   Used for:  Previous  section complicating pregnancy, antepartum condition or complication        Dose:  600 mg   Take 1 tablet (600 mg) by mouth every 6 hours as needed for other (cramping)   Quantity:  90 tablet   Refills:  0       lanolin ointment   Used for:  Previous  section complicating pregnancy, antepartum condition or complication, Postpartum care and examination of lactating mother        Apply topically every hour as needed for dry skin (soreness)   Quantity:  40 g   Refills:  0       oxyCODONE 5 MG IR tablet   Commonly known as:  ROXICODONE   Used for:  Previous  section complicating pregnancy, antepartum condition or complication        Dose:  5 mg   Take 1 tablet (5 mg) by mouth every 4 hours as needed for moderate to severe pain   Quantity:  20 tablet   Refills:  0       senna-docusate 8.6-50 MG per tablet   Commonly known as:  SENOKOT-S;PERICOLACE   Used for:  Previous  section complicating pregnancy, antepartum condition or complication        Dose:  1-2 tablet   Take 1-2 tablets by mouth 2 times  daily   Quantity:  100 tablet   Refills:  0         CONTINUE these medicines which may have CHANGED, or have new prescriptions. If we are uncertain of the size of tablets/capsules you have at home, strength may be listed as something that might have changed.        Dose / Directions    ferrous sulfate 325 (65 FE) MG tablet   Commonly known as:  IRON   This may have changed:    - medication strength  - how much to take  - when to take this   Used for:  Previous  section complicating pregnancy, antepartum condition or complication        Dose:  325 mg   Take 1 tablet (325 mg) by mouth daily   Quantity:  100 tablet   Refills:  0         CONTINUE these medicines which have NOT CHANGED        Dose / Directions    prenatal multivitamin plus iron 27-0.8 MG Tabs per tablet        Dose:  1 tablet   Take 1 tablet by mouth daily   Refills:  0       VITAMIN D (CHOLECALCIFEROL) PO        Dose:  400 Units   Take 400 Units by mouth daily   Refills:  0         STOP taking     norgestim-eth estrad triphasic 0.18/0.215/0.25 MG-35 MCG per tablet   Commonly known as:  TRINESSA (28)                Where to get your medicines      Some of these will need a paper prescription and others can be bought over the counter. Ask your nurse if you have questions.     Bring a paper prescription for each of these medications     ferrous sulfate 325 (65 FE) MG tablet    ibuprofen 600 MG tablet    lanolin ointment    oxyCODONE 5 MG IR tablet    senna-docusate 8.6-50 MG per tablet                Protect others around you: Learn how to safely use, store and throw away your medicines at www.disposemymeds.org.             Medication List: This is a list of all your medications and when to take them. Check marks below indicate your daily home schedule. Keep this list as a reference.      Medications           Morning Afternoon Evening Bedtime As Needed    ferrous sulfate 325 (65 FE) MG tablet   Commonly known as:  IRON   Take 1 tablet (325 mg) by  mouth daily   Last time this was given:  325 mg on 10/23/2017  8:22 AM                                ibuprofen 600 MG tablet   Commonly known as:  ADVIL/MOTRIN   Take 1 tablet (600 mg) by mouth every 6 hours as needed for other (cramping)   Last time this was given:  800 mg on 10/23/2017  8:21 AM                                lanolin ointment   Apply topically every hour as needed for dry skin (soreness)                                oxyCODONE 5 MG IR tablet   Commonly known as:  ROXICODONE   Take 1 tablet (5 mg) by mouth every 4 hours as needed for moderate to severe pain   Last time this was given:  5 mg on 10/23/2017  8:21 AM                                prenatal multivitamin plus iron 27-0.8 MG Tabs per tablet   Take 1 tablet by mouth daily                                senna-docusate 8.6-50 MG per tablet   Commonly known as:  SENOKOT-S;PERICOLACE   Take 1-2 tablets by mouth 2 times daily   Last time this was given:  1 tablet on 10/23/2017  8:21 AM                                VITAMIN D (CHOLECALCIFEROL) PO   Take 400 Units by mouth daily

## 2017-10-20 NOTE — PROCEDURES
OP NOTE    Preop Dx:  32 year old  at 39W 1D  Previous  Section; desires repeat    Postop Dx:  Same    Procedures:  Repeat LTCS via pfannenstiel skin incision    Surgeons:  Gianni Bill    Anesthesia:  Spinal    EBL:  800 cc    Observations:  Single viable female infant in cephalic presentation (OT). Clear amniotic fluid. Nuchal cord x 1.  APGARS 9 and 9.  Weight 3250 grams or 7 lbs 3 oz.  Normal appearing uterus, tubes and ovaries.  Minimal adhesions.    Complications:  None     Specimens:  Placenta     Disposition:  Patient stable to the recovery room    Dictated #[id number]

## 2017-10-20 NOTE — ANESTHESIA POSTPROCEDURE EVALUATION
Patient: Nicolasa Moore    Procedure(s):  REPEAT  SECTION - Wound Class: II-Clean Contaminated    Diagnosis: SECTION  Diagnosis Additional Information: No value filed.    Anesthesia Type:  Spinal    Note:  Anesthesia Post Evaluation    Patient location during evaluation: PACU  Patient participation: Able to participate in evaluation but full recovery from regional anesthesia has not yet ocurrred but is anticipated to occur within 48 hours  Level of consciousness: awake and alert  Pain management: adequate  Airway patency: patent  Cardiovascular status: acceptable, hemodynamically stable and blood pressure returned to baseline  Respiratory status: acceptable  Hydration status: acceptable  PONV: none     Anesthetic complications: None    Comments: Spinal receeding as expected.         Last vitals:  Vitals:    10/20/17 1130 10/20/17 1145 10/20/17 1200   BP: 94/64 106/70 97/67   Resp: 14 27 11   Temp:      SpO2: 92% 93% 94%         Electronically Signed By: Shweta Malhotra MD  2017  12:13 PM
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
## 2017-10-20 NOTE — PLAN OF CARE
At 1315 Pt. admitted from L&D via bed.. Pt. arrived with baby and was accompanied by RN& and arrived with personal belongings. Report was taken from Brenda Michele RN in L&D.  Pt. is A&Ox3 and VSS on RA. Fundus is firm and midline.  Vaginal bleeding is scan.   Pt. c/o 0/10 pain. Pt. denied  nausea, CP, SOB, lightheadedness, or dizziness. PIV patent and infusing.  Martinez patent and draining.  Dressing to lower abdomen scant amount of drainage noted.  Pneumoboots in place to BLE.  Pt. oriented to the room and call light system.

## 2017-10-20 NOTE — ANESTHESIA CARE TRANSFER NOTE
Patient: Nicolasa Moore    Procedure(s):  REPEAT  SECTION - Wound Class: II-Clean Contaminated    Diagnosis:  SECTION  Diagnosis Additional Information: No value filed.    Anesthesia Type:   Spinal     Note:  Airway :Room Air  Patient transferred to:Labor and Delivery  Comments: VSS on Room Air. Talking and holding baby. Report given to RN before transfer of pt care.Handoff Report: Identifed the Patient, Identified the Reponsible Provider, Reviewed the pertinent medical history, Discussed the surgical course, Reviewed Intra-OP anesthesia mangement and issues during anesthesia, Set expectations for post-procedure period and Allowed opportunity for questions and acknowledgement of understanding      Vitals: (Last set prior to Anesthesia Care Transfer)    CRNA VITALS  10/20/2017 1039 - 10/20/2017 1121      10/20/2017             NIBP: 98/41    NIBP Mean: 61    Resp Rate (set): 10                Electronically Signed By: FAREED Jones CRNA  2017  11:21 AM

## 2017-10-20 NOTE — ANESTHESIA PROCEDURE NOTES
Peripheral nerve/Neuraxial procedure note : intrathecal  Pre-Procedure  Performed by HERMILO HELMS  Location: OB      Pre-Anesthestic Checklist: patient identified, IV checked, site marked, risks and benefits discussed, informed consent, monitors and equipment checked, pre-op evaluation, at physician/surgeon's request and post-op pain management    Timeout  Correct Patient: Yes   Correct Procedure: Yes   Correct Site: Yes   Correct Laterality: Yes   Correct Position: Yes   Site Marked: Yes   .   Procedure Documentation    .    Procedure:    Intrathecal.  Insertion Site:L2-3  (midline approach)      Patient Prep;povidone-iodine 7.5% surgical scrub.  .  Needle: Graham tip Spinal Needle (gauge): 25  Spinal/LP Needle Length (inches): 3 # of attempts: 2 and  # of redirects:  3 Introducer used Introducer: 20 G .       Assessment/Narrative  Paresthesias: No.  .  .  clear CSF fluid removed while sitting   . Comments:  Patient sitting on edge of OR bed, lower back cleaned and prepped in sterile fashion with betadine. 1% lido used to numb area. Introducer placed, spinal needle through introducer. Appropriate flow of CSF and confirmed with aspiration via syringe. Spinal dose given, 12 mg 0.75% bupivacaine, 150 mcg morphine, 15 mcg fentanyl. No complications.

## 2017-10-20 NOTE — PROGRESS NOTES
Data: Nicolasa Moore transferred to [id number] via bed at 1310. Baby transferred via parent's arms.  Action: Receiving unit notified of transfer: Yes. Patient and family notified of room change. Report given to Kriss LAZCANO RN, at 1315. Belongings sent to receiving unit. Accompanied by Registered Nurse. Oriented patient to surroundings. Call light within reach. ID bands double-checked with receiving RN.  Response: Patient tolerated transfer and is stable.

## 2017-10-20 NOTE — OP NOTE
DATE OF PROCEDURE:  10/20/2017       PREOPERATIVE DIAGNOSES:   1.  A 32-year-old  2, para 1-0-0-1 at 39 weeks 1 day gestation.   2.  Previous  section, desires repeat.      POSTOPERATIVE DIAGNOSES:   1.  A 32-year-old  2, para 1-0-0-1 at 39 weeks 1 day gestation.   2.  Previous  section, desires repeat.      PROCEDURE:  Repeat low transverse  section via Pfannenstiel skin incision.       SURGEONS:  Phan Bill MD; Kal Archer MD      ANESTHESIA:  Spinal.      ESTIMATED BLOOD LOSS:  800 mL.       OBSERVATIONS:  Single viable female infant in cephalic presentation (OT).  Clear amniotic fluid.  Nuchal cord x1.  Apgars 9 and 9.  Weight 7 pounds 3 ounces.  Normal appearing uterus, tubes and ovaries.  Minimal adhesions noted.      COMPLICATIONS:  None.      SPECIMENS:  Placenta.      DISPOSITION:  Patient stable to the recovery.      INDICATIONS:  Nicolasa Moore is a 32-year-old  2, para 1-0-0-1 who presents at 39 weeks 1 day gestation for an elective repeat  section.  She declines tubal ligation for permanent sterilization at the time of the repeat  section.  The patient is well informed regarding her options regarding a repeat  section versus a trial of labor after  section.  We went over the risks, benefits, pros and cons, including risks of surgery (bleeding, infection, injury to other organs).  She desires to proceed with a repeat  section at 39 weeks 1 day gestation.      DESCRIPTION OF PROCEDURE:  The patient was taken to the operating room, where spinal anesthesia was found to be adequate.  The patient was then prepped and draped in normal sterile fashion in dorsal supine position with leftward tilt.  Using a scalpel, a Pfannenstiel skin incision was made through the patient's prior skin incision scar.  This incision was carried down to the underlying layer of fascia.  The fascia was then nicked in the midline.  This  incision was extended laterally.  The superior aspect of the fascial incision was then grasped with 2 Kocher clamps, elevated, and the rectus muscles were dissected up bluntly as well as sharply with cautery.  In similar fashion, the inferior aspect of the fascial incision was grasped with 2 Kocher clamps, elevated, and the rectus muscles dissected off sharply as well as bluntly.  The rectus muscles were then  in midline, peritoneum identified and entered bluntly with a hemostat.  This opening was extended superiorly and inferiorly with excellent visualization of the bladder.  The bladder blade was then inserted and the vesicouterine peritoneum identified, grasped with pickups and entered sharply with Metzenbaum scissors.  This incision was extended laterally and a bladder flap created digitally.  Bladder blade was then repositioned.      Using a scalpel, low transverse uterine incision was made.  This incision was extended laterally via finger fractionation.  The membranes were entered with an Allis clamp.  Clear amniotic fluid was noted.  The infant's head was delivered atraumatically.  Nose and mouth were bulb suctioned.  The nuchal cord was reduced without difficulty.  The remainder of the infant was delivered atraumatically.  The cord was clamped and cut about a little more than 1 minute after birth as the cord was no longer pulsating at that point.  The infant was handed off to waiting nurses.  The placenta was then removed manually and the uterus exteriorized and cleared of all clots and debris.        Uterine incision was closed in 2 layers with 1-0 chromic gut.  Excellent hemostasis was noted.  The bladder flap was closed with 3-0 chromic in a running fashion.  The uterus was then returned to the abdomen and the gutters irrigated and cleared of all clots and debris.  One final look at the uterine incision revealed excellent hemostasis.  The peritoneum was grasped with 3 Renae clamps and closed with  2-0 Vicryl in running fashion.  The subfascial layers and rectus muscles were then examined.  Excellent hemostasis was noted.  The fascia was tagged in the midline with a single interrupted stitch of 0 Vicryl and then reapproximated from 1 apex to midline and then the other apex to midline with a running stitch of 0 Vicryl.  The subq was irrigated.  Excellent hemostasis was noted.  Subcutaneous was reapproximated with running stitch of 3-0 plain gut.  The skin was closed with 4-0 Monocryl in subcuticular fashion.      The patient tolerated the procedure well.  Sponge, lap and needle counts were reported to me as correct x2.  The patient received 2 grams Ancef prior to the start of the case for antibiotic prophylaxis.  The patient was taken to the recovery room in stable condition.         VIPIN ADAMES MD             D: 10/20/2017 11:16   T: 10/20/2017 18:34   MT: [id number]      Name:     DANIELE WILSON   MRN:      [id number]        Account:        [id number]   :      1985           Procedure Date: 10/20/2017      Document: [id number]

## 2017-10-21 PROBLEM — D62 ANEMIA DUE TO BLOOD LOSS, ACUTE: Status: ACTIVE | Noted: 2017-10-21

## 2017-10-21 LAB
HGB BLD-MCNC: 6.9 G/DL (ref 11.7–15.7)
T PALLIDUM IGG+IGM SER QL: NEGATIVE

## 2017-10-21 PROCEDURE — 25000128 H RX IP 250 OP 636: Performed by: OBSTETRICS & GYNECOLOGY

## 2017-10-21 PROCEDURE — 25000132 ZZH RX MED GY IP 250 OP 250 PS 637: Performed by: OBSTETRICS & GYNECOLOGY

## 2017-10-21 PROCEDURE — 85018 HEMOGLOBIN: CPT | Performed by: OBSTETRICS & GYNECOLOGY

## 2017-10-21 PROCEDURE — 12000037 ZZH R&B POSTPARTUM INTERMEDIATE

## 2017-10-21 PROCEDURE — 36415 COLL VENOUS BLD VENIPUNCTURE: CPT | Performed by: OBSTETRICS & GYNECOLOGY

## 2017-10-21 RX ADMIN — ACETAMINOPHEN 975 MG: 325 TABLET, FILM COATED ORAL at 03:26

## 2017-10-21 RX ADMIN — SODIUM CHLORIDE, SODIUM LACTATE, POTASSIUM CHLORIDE, CALCIUM CHLORIDE, AND DEXTROSE MONOHYDRATE: 600; 310; 30; 20; 5 INJECTION, SOLUTION INTRAVENOUS at 04:30

## 2017-10-21 RX ADMIN — OXYCODONE HYDROCHLORIDE 5 MG: 5 TABLET ORAL at 20:28

## 2017-10-21 RX ADMIN — SENNOSIDES AND DOCUSATE SODIUM 2 TABLET: 8.6; 5 TABLET ORAL at 08:14

## 2017-10-21 RX ADMIN — OXYCODONE HYDROCHLORIDE 5 MG: 5 TABLET ORAL at 12:22

## 2017-10-21 RX ADMIN — OXYCODONE HYDROCHLORIDE 5 MG: 5 TABLET ORAL at 23:37

## 2017-10-21 RX ADMIN — ACETAMINOPHEN 975 MG: 325 TABLET, FILM COATED ORAL at 20:28

## 2017-10-21 RX ADMIN — IBUPROFEN 800 MG: 400 TABLET ORAL at 11:29

## 2017-10-21 RX ADMIN — IBUPROFEN 800 MG: 400 TABLET ORAL at 16:47

## 2017-10-21 RX ADMIN — SENNOSIDES AND DOCUSATE SODIUM 1 TABLET: 8.6; 5 TABLET ORAL at 20:28

## 2017-10-21 RX ADMIN — OXYCODONE HYDROCHLORIDE 5 MG: 5 TABLET ORAL at 16:47

## 2017-10-21 RX ADMIN — ACETAMINOPHEN 975 MG: 325 TABLET, FILM COATED ORAL at 11:29

## 2017-10-21 RX ADMIN — KETOROLAC TROMETHAMINE 30 MG: 30 INJECTION, SOLUTION INTRAMUSCULAR at 05:10

## 2017-10-21 RX ADMIN — IBUPROFEN 800 MG: 400 TABLET ORAL at 23:38

## 2017-10-21 NOTE — PLAN OF CARE
Problem: Patient Care Overview  Goal: Plan of Care/Patient Progress Review  Outcome: Improving  Vital signs stable, afebrile, O2 sats on RA 97-99%, voiding adequate amounts of clear urine per goyal, dressing is CDI with markings unchanged, FFU/1 small rubra lochia, given an abdominal binder for incisional suppport, trace LE edema bilaterally, IVF continue at 125cc/hr per protocol, able to rest, pain controlled with medications, rates her pain 1/10, breast feeding  skin-to-skin on demand.  is here overnight.

## 2017-10-21 NOTE — LACTATION NOTE
Initial Lactation visit. Hand out given. Recommend unlimited, frequent breast feedings: At least 8 - 12 times every 24 hours. Avoid pacifiers and supplementation with formula unless medically indicated. Explained benefits of holding baby skin on skin to help promote better breastfeeding outcomes. Will revisit as needed.    Amali states breastfeeding is going well. She's pleased with how well things are going.    Jessica Renee RN IBCLC

## 2017-10-21 NOTE — PLAN OF CARE
Problem: Postpartum ( Delivery) (Adult,Obstetrics,Pediatric)  Goal: Signs and Symptoms of Listed Potential Problems Will be Absent, Minimized or Managed (Postpartum)  Signs and symptoms of listed potential problems will be absent, minimized or managed by discharge/transition of care (reference Postpartum ( Delivery) (Adult,Obstetrics,Pediatric) [facility]).   Outcome: Improving  VSS. FF; scant flow. Dressing removed. Incision intact with steristrips in place. Pain controlled with prn and scheduled meds. Martinez removed; SL in place. Hgb 6.9, MD aware. Hgb recheck in AM. Tolerated shower well. Breastfeeding infant independent.y. Encouraged to call with questions. Will continue to monitor.

## 2017-10-21 NOTE — PLAN OF CARE
Problem: Patient Care Overview  Goal: Plan of Care/Patient Progress Review  Outcome: Improving  Pt ambulated to BR. Tolerated well. IV and goyal patent. CLD and toast with no N/V. Good pain control with toradol. Nursing going well.

## 2017-10-21 NOTE — PROGRESS NOTES
POD #1    No unusual complaints.  Has been up walking, not dizzy.  No nausea or vomiting.   Martinez still in. Pain under good control.    104/65   p 83    RR  18      Abdomen soft.  Dressing dry.    Pre op hgb 11.4, hgb this AM 6.9    Stable POD #1  Hemoglobin drop more than expected.  Patient tolerating well.    Will watch for now.    Recheck hgb in AM

## 2017-10-22 LAB — HGB BLD-MCNC: 9.4 G/DL (ref 11.7–15.7)

## 2017-10-22 PROCEDURE — 36415 COLL VENOUS BLD VENIPUNCTURE: CPT | Performed by: OBSTETRICS & GYNECOLOGY

## 2017-10-22 PROCEDURE — 12000037 ZZH R&B POSTPARTUM INTERMEDIATE

## 2017-10-22 PROCEDURE — 85018 HEMOGLOBIN: CPT | Performed by: OBSTETRICS & GYNECOLOGY

## 2017-10-22 PROCEDURE — 25000132 ZZH RX MED GY IP 250 OP 250 PS 637: Performed by: OBSTETRICS & GYNECOLOGY

## 2017-10-22 RX ORDER — FERROUS SULFATE 325(65) MG
325 TABLET ORAL DAILY
Status: DISCONTINUED | OUTPATIENT
Start: 2017-10-22 | End: 2017-10-23 | Stop reason: HOSPADM

## 2017-10-22 RX ADMIN — ACETAMINOPHEN 975 MG: 325 TABLET, FILM COATED ORAL at 03:07

## 2017-10-22 RX ADMIN — OXYCODONE HYDROCHLORIDE 5 MG: 5 TABLET ORAL at 22:13

## 2017-10-22 RX ADMIN — OXYCODONE HYDROCHLORIDE 5 MG: 5 TABLET ORAL at 12:39

## 2017-10-22 RX ADMIN — OXYCODONE HYDROCHLORIDE 5 MG: 5 TABLET ORAL at 18:44

## 2017-10-22 RX ADMIN — OXYCODONE HYDROCHLORIDE 5 MG: 5 TABLET ORAL at 09:41

## 2017-10-22 RX ADMIN — OXYCODONE HYDROCHLORIDE 5 MG: 5 TABLET ORAL at 06:23

## 2017-10-22 RX ADMIN — OXYCODONE HYDROCHLORIDE 5 MG: 5 TABLET ORAL at 03:06

## 2017-10-22 RX ADMIN — SENNOSIDES AND DOCUSATE SODIUM 1 TABLET: 8.6; 5 TABLET ORAL at 09:41

## 2017-10-22 RX ADMIN — IBUPROFEN 800 MG: 400 TABLET ORAL at 11:59

## 2017-10-22 RX ADMIN — ACETAMINOPHEN 975 MG: 325 TABLET, FILM COATED ORAL at 18:49

## 2017-10-22 RX ADMIN — SENNOSIDES AND DOCUSATE SODIUM 1 TABLET: 8.6; 5 TABLET ORAL at 18:49

## 2017-10-22 RX ADMIN — IBUPROFEN 800 MG: 400 TABLET ORAL at 18:44

## 2017-10-22 RX ADMIN — OXYCODONE HYDROCHLORIDE 5 MG: 5 TABLET ORAL at 15:14

## 2017-10-22 RX ADMIN — ACETAMINOPHEN 975 MG: 325 TABLET, FILM COATED ORAL at 11:59

## 2017-10-22 RX ADMIN — FERROUS SULFATE TAB 325 MG (65 MG ELEMENTAL FE) 325 MG: 325 (65 FE) TAB at 11:59

## 2017-10-22 NOTE — PLAN OF CARE
Problem: Patient Care Overview  Goal: Plan of Care/Patient Progress Review  Outcome: Improving  Vital signs stable, afebrile, voiding, incision is CDI with steri-strips, FFU/1 scant rubra lochia, able to rest, pain well controlled with medications, rates her pain 3/10, breast feeding  skin-to-skin on demand. Patient's mother is here overnight and is supportive.

## 2017-10-22 NOTE — PROGRESS NOTES
POD # 2    S: Doing well. Tolerating regular diet, ambulating, voiding, pain controlled, passing flatus, breastfeeding without difficulty.    O:  /69  Pulse 89  Temp 97.8  F (36.6  C) (Oral)  Resp 16  Wt 108.9 kg (240 lb)  LMP 01/17/2017 (Approximate)  SpO2 100%  Breastfeeding? Unknown  BMI 38.74 kg/m2    Gen NAD  Abd soft NTND   Incision c/d/i with steri strips in place      Pre op hgb 11.4  10/21: 6.9    Hemoglobin   Date Value Ref Range Status   10/22/2017 9.4 (L) 11.7 - 15.7 g/dL Final   ]    A/P: POD #2 after repeat CS, clinically stable  Postoperative anemia: asymptomatic and much less severe than resulted yesterday, will continue Ferrous sulfate  Continue routine postpartum care    Jennifer L. Schwab M.D.

## 2017-10-22 NOTE — PLAN OF CARE
Problem: Patient Care Overview  Goal: Plan of Care/Patient Progress Review  Outcome: No Change  VSS. Voiding spontaneously in adequate amounts. Bleeding wnl. Up ad abe and tolerating well. Pain well managed with current regimen. Started oral iron today for low hemoglobin and history of anemia, reports being relatively asymptomatic w/ occasional dizziness. Will continue to monitor.

## 2017-10-22 NOTE — PLAN OF CARE
Problem: Patient Care Overview  Goal: Plan of Care/Patient Progress Review  Outcome: No Change  VSS.  Pain well controlled, requesting prn pain medications as needed.  Up independently in room.  Working on breastfeeding  and  cares. On pathway. Continue to monitor and notify MD as needed.

## 2017-10-22 NOTE — LACTATION NOTE
Follow up visit. Luciaali states breastfeeding continues to go well but she's concerned infant is at a 9% weight loss. Infant has had age appropriate voids and stools and her stools are greenish. RN brought pump into pt's room for her to pump to maximize milk production. Will revisit as needed.

## 2017-10-23 VITALS
OXYGEN SATURATION: 100 % | RESPIRATION RATE: 16 BRPM | BODY MASS INDEX: 38.74 KG/M2 | HEART RATE: 80 BPM | SYSTOLIC BLOOD PRESSURE: 107 MMHG | DIASTOLIC BLOOD PRESSURE: 60 MMHG | WEIGHT: 240 LBS | TEMPERATURE: 98.4 F

## 2017-10-23 PROCEDURE — 90686 IIV4 VACC NO PRSV 0.5 ML IM: CPT | Performed by: OBSTETRICS & GYNECOLOGY

## 2017-10-23 PROCEDURE — 25000132 ZZH RX MED GY IP 250 OP 250 PS 637: Performed by: OBSTETRICS & GYNECOLOGY

## 2017-10-23 PROCEDURE — 25000128 H RX IP 250 OP 636: Performed by: OBSTETRICS & GYNECOLOGY

## 2017-10-23 RX ORDER — OXYCODONE HYDROCHLORIDE 5 MG/1
5 TABLET ORAL EVERY 4 HOURS PRN
Qty: 20 TABLET | Refills: 0 | Status: SHIPPED | OUTPATIENT
Start: 2017-10-23 | End: 2018-05-15

## 2017-10-23 RX ORDER — IBUPROFEN 600 MG/1
600 TABLET, FILM COATED ORAL EVERY 6 HOURS PRN
Qty: 90 TABLET | Refills: 0 | Status: SHIPPED | OUTPATIENT
Start: 2017-10-23 | End: 2018-05-15

## 2017-10-23 RX ORDER — FERROUS SULFATE 325(65) MG
325 TABLET ORAL DAILY
Qty: 100 TABLET | Refills: 0 | Status: SHIPPED | OUTPATIENT
Start: 2017-10-23 | End: 2018-05-15

## 2017-10-23 RX ORDER — AMOXICILLIN 250 MG
1-2 CAPSULE ORAL 2 TIMES DAILY
Qty: 100 TABLET | Refills: 0 | Status: SHIPPED | OUTPATIENT
Start: 2017-10-23 | End: 2018-05-15

## 2017-10-23 RX ORDER — LANOLIN 100 %
OINTMENT (GRAM) TOPICAL
Qty: 40 G | Refills: 0 | Status: SHIPPED | OUTPATIENT
Start: 2017-10-23 | End: 2018-05-15

## 2017-10-23 RX ADMIN — ACETAMINOPHEN 975 MG: 325 TABLET, FILM COATED ORAL at 04:07

## 2017-10-23 RX ADMIN — OXYCODONE HYDROCHLORIDE 5 MG: 5 TABLET ORAL at 08:21

## 2017-10-23 RX ADMIN — SENNOSIDES AND DOCUSATE SODIUM 1 TABLET: 8.6; 5 TABLET ORAL at 08:21

## 2017-10-23 RX ADMIN — OXYCODONE HYDROCHLORIDE 5 MG: 5 TABLET ORAL at 01:00

## 2017-10-23 RX ADMIN — OXYCODONE HYDROCHLORIDE 5 MG: 5 TABLET ORAL at 04:58

## 2017-10-23 RX ADMIN — IBUPROFEN 800 MG: 400 TABLET ORAL at 08:21

## 2017-10-23 RX ADMIN — IBUPROFEN 800 MG: 400 TABLET ORAL at 01:00

## 2017-10-23 RX ADMIN — FERROUS SULFATE TAB 325 MG (65 MG ELEMENTAL FE) 325 MG: 325 (65 FE) TAB at 08:22

## 2017-10-23 RX ADMIN — OXYCODONE HYDROCHLORIDE 5 MG: 5 TABLET ORAL at 13:49

## 2017-10-23 RX ADMIN — INFLUENZA A VIRUS A/MICHIGAN/45/2015 X-275 (H1N1) ANTIGEN (FORMALDEHYDE INACTIVATED), INFLUENZA A VIRUS A/HONG KONG/4801/2014 X-263B (H3N2) ANTIGEN (FORMALDEHYDE INACTIVATED), INFLUENZA B VIRUS B/PHUKET/3073/2013 ANTIGEN (FORMALDEHYDE INACTIVATED), AND INFLUENZA B VIRUS B/BRISBANE/60/2008 ANTIGEN (FORMALDEHYDE INACTIVATED) 0.5 ML: 15; 15; 15; 15 INJECTION, SUSPENSION INTRAMUSCULAR at 12:03

## 2017-10-23 NOTE — PLAN OF CARE
Problem: Patient Care Overview  Goal: Plan of Care/Patient Progress Review  Outcome: No Change  Further education given regarding BF and supplemental feedings: baby weight loss > 10% and behind on diapers. Baby latches well, sleepy at breast.

## 2017-10-23 NOTE — DISCHARGE SUMMARY
[facility]    Discharge Summary  Obstetrics    Date of Admission:  10/20/2017  Date of Discharge:  10/23/2017  Discharging Provider: Zora Miranda MD, MD  Date of Service (when I saw the patient): 10/23/17    Discharge Diagnoses    SECTION  Acute Blood Loss Anemia, Dilutional    Procedure/Surgery Information   Procedure: Procedure(s):  REPEAT  SECTION - Wound Class: II-Clean Contaminated   Surgeon(s): Surgeon(s) and Role:     * Kal Archer MD - Assisting     * Phan Bill MD - Primary     History of Present Illness   Nicolasa Moore is a 32 year old female who presented with repeat  section    Hospital Course   The patient's hospital course was unremarkable.  She recovered as anticipated and experienced no post-operative complications.  On discharge, her pain was well controlled. Vaginal bleeding is similar to peak menstrual flow.  Voiding without difficulty.  Ambulating well and tolerating a normal diet.  No fever or significant wound drainage.  Breastfeeding well.  Infant is stable.  She was discharged on post-partum day #3. Acute Blood Loss Anemia, Dilutional as result of repeat  section.    Post-partum hemoglobin:   Hemoglobin   Date Value Ref Range Status   10/22/2017 9.4 (L) 11.7 - 15.7 g/dL Final       Zora Miranda MD    Discharge Disposition   Discharged to home   Condition at discharge: Stable    Pending Results   Final pathology results: No pathology submitted  These results will be followed up by   Unresulted Labs Ordered in the Past 30 Days of this Admission     No orders found from 2017 to 10/21/2017.          Primary Care Physician   Nyasia Smith    Consultations This Hospital Stay   HOME CARE POST PARTUM/ IP CONSULT  LACTATION IP CONSULT    Discharge Orders     Activity   Review discharge instructions     Reason for your hospital stay   Maternity care     Follow Up and recommended labs and tests   Follow  up with Dr. Bill , at (location with clinic name or city) [facility] [place], within 6 weeks  Routine postpartum check. No follow up labs or test are needed.     Discharge Instructions - Gestational diabetic patients   Gestational diabetic patients to follow up for fasting blood sugar and 2 hour 75gm glucose load at 6 weeks postpartum.     Discharge Instructions - Hypertensive disorders patients   High Blood pressure patients to follow up in clinic or via home care within one week for a blood pressure check     Discharge Instructions - Postpartum visit   Schedule postpartum visit with your provider and return to clinic in 6 weeks.     Diet   Resume previous diet       Discharge Medications   Current Discharge Medication List      START taking these medications    Details   oxyCODONE (ROXICODONE) 5 MG IR tablet Take 1 tablet (5 mg) by mouth every 4 hours as needed for moderate to severe pain  Qty: 20 tablet, Refills: 0    Associated Diagnoses: Status post repeat low transverse  section; Previous  section complicating pregnancy, antepartum condition or complication      ibuprofen (ADVIL/MOTRIN) 600 MG tablet Take 1 tablet (600 mg) by mouth every 6 hours as needed for other (cramping)  Qty: 90 tablet, Refills: 0    Associated Diagnoses: Status post repeat low transverse  section; Previous  section complicating pregnancy, antepartum condition or complication      senna-docusate (SENOKOT-S;PERICOLACE) 8.6-50 MG per tablet Take 1-2 tablets by mouth 2 times daily  Qty: 100 tablet, Refills: 0    Associated Diagnoses: Status post repeat low transverse  section; Previous  section complicating pregnancy, antepartum condition or complication; Anemia due to blood loss, acute      lanolin ointment Apply topically every hour as needed for dry skin (soreness)  Qty: 40 g, Refills: 0    Associated Diagnoses: Status post repeat low transverse  section; Previous   section complicating pregnancy, antepartum condition or complication; Postpartum care and examination of lactating mother         CONTINUE these medications which have CHANGED    Details   ferrous sulfate (IRON) 325 (65 FE) MG tablet Take 1 tablet (325 mg) by mouth daily  Qty: 100 tablet, Refills: 0    Associated Diagnoses: Anemia due to blood loss, acute; Previous  section complicating pregnancy, antepartum condition or complication; Status post repeat low transverse  section         CONTINUE these medications which have NOT CHANGED    Details   Prenatal Vit-Fe Fumarate-FA (PRENATAL MULTIVITAMIN PLUS IRON) 27-0.8 MG TABS per tablet Take 1 tablet by mouth daily      VITAMIN D, CHOLECALCIFEROL, PO Take 400 Units by mouth daily         STOP taking these medications       norgestim-eth estrad triphasic (TRINESSA, 28,) 0.18/0.215/0.25 MG-35 MCG per tablet Comments:   Reason for Stopping:             Allergies   Allergies   Allergen Reactions     Penicillins Itching

## 2017-10-23 NOTE — PLAN OF CARE
Problem: Patient Care Overview  Goal: Plan of Care/Patient Progress Review  Outcome: Adequate for Discharge Date Met:  10/23/17  Pt. stable, ready to go home today. Taking iron daily for Hgb. of 9.4. Ibuprofen and Oxycodone for pain. Inc. intact with steri strips and tegaderm. Breastfeeding a baby girl and feels like milk is coming in. Pumping after feedings and suppl. baby EBM and formula by finger feeding for 10% wt. loss. D/C instructions reviewed with pt. and she verbalized understanding. D/C to home with baby.

## 2017-10-23 NOTE — LACTATION NOTE
Follow up visit.  Infant feeding well but at 10% weight loss.  Nicolasa is pumping.  Getting small amounts of milk after feeding.  Infant latched well at time of visit and audible swallowing heard.  Reviewed supplementing and volume to supplement.  Nicolasa felt comfortable with plan for feeding.  Encouraged her to continue pumping until not needing to supplement.  Discussed outpatient lactation resources for use upon discharge.    Janice Solares RN, IBCLC

## 2017-10-23 NOTE — PROGRESS NOTES
[facility]    Post-Partum Progress Note   Obstetrics and Gynecology    Assessment & Plan     ASSESSMENT:  -3 Days Post-Op  Procedure(s):  REPEAT  SECTION - Wound Class: II-Clean Contaminated  Prenatal course was essentially uncomplicated  Doing well.  Normal healing wound.  No excessive bleeding  Pain well-controlled.    PLAN:  Ambulation encouraged  Breast feeding strategies discussed  Pain control measures as needed    Zora Miranda MD, MD [facility]      Interval History   The baby is well.  No problems with feeding.  Pain is adequately controlled. No immediate concerns identified.      Physical Exam   B/P: 107/60, T: 98.4, P: 80, R: 16    Uterine fundus is firm, non-tender and at the level of the umbilicus  Incision C/D/I    Data   Recent Labs   Lab Test  10/20/17   0750   ABO  B   RH  Pos   AS  Neg     Recent Labs   Lab Test  10/22/17   0956  10/21/17   0751   HGB  9.4*  6.9*     Recent Labs   Lab Test  17   1040   [id number]  45

## 2017-12-03 ENCOUNTER — HEALTH MAINTENANCE LETTER (OUTPATIENT)
Age: 32
End: 2017-12-03

## 2018-03-05 ENCOUNTER — APPOINTMENT (OUTPATIENT)
Dept: GENERAL RADIOLOGY | Facility: CLINIC | Age: 33
End: 2018-03-05
Attending: EMERGENCY MEDICINE
Payer: COMMERCIAL

## 2018-03-05 ENCOUNTER — TELEPHONE (OUTPATIENT)
Dept: NURSING | Facility: CLINIC | Age: 33
End: 2018-03-05

## 2018-03-05 ENCOUNTER — HOSPITAL ENCOUNTER (EMERGENCY)
Facility: CLINIC | Age: 33
Discharge: HOME | End: 2018-03-05
Attending: EMERGENCY MEDICINE | Admitting: EMERGENCY MEDICINE
Payer: COMMERCIAL

## 2018-03-05 VITALS
OXYGEN SATURATION: 98 % | RESPIRATION RATE: 16 BRPM | TEMPERATURE: 98 F | WEIGHT: 200 LBS | DIASTOLIC BLOOD PRESSURE: 88 MMHG | BODY MASS INDEX: 31.39 KG/M2 | HEART RATE: 84 BPM | HEIGHT: 67 IN | SYSTOLIC BLOOD PRESSURE: 122 MMHG

## 2018-03-05 DIAGNOSIS — R00.2 PALPITATIONS: ICD-10-CM

## 2018-03-05 DIAGNOSIS — R07.9 CHEST PAIN, UNSPECIFIED TYPE: ICD-10-CM

## 2018-03-05 LAB
ANION GAP SERPL CALCULATED.3IONS-SCNC: 7 MMOL/L (ref 3–14)
BASOPHILS # BLD AUTO: 0 10E9/L (ref 0–0.2)
BASOPHILS NFR BLD AUTO: 0.2 %
BUN SERPL-MCNC: 14 MG/DL (ref 7–30)
CALCIUM SERPL-MCNC: 9 MG/DL (ref 8.5–10.1)
CHLORIDE SERPL-SCNC: 106 MMOL/L (ref 94–109)
CO2 SERPL-SCNC: 27 MMOL/L (ref 20–32)
CREAT SERPL-MCNC: 0.56 MG/DL (ref 0.52–1.04)
DIFFERENTIAL METHOD BLD: ABNORMAL
EOSINOPHIL # BLD AUTO: 0.1 10E9/L (ref 0–0.7)
EOSINOPHIL NFR BLD AUTO: 1 %
ERYTHROCYTE [DISTWIDTH] IN BLOOD BY AUTOMATED COUNT: 16.2 % (ref 10–15)
GFR SERPL CREATININE-BSD FRML MDRD: >90 ML/MIN/1.7M2
GLUCOSE SERPL-MCNC: 94 MG/DL (ref 70–99)
HCT VFR BLD AUTO: 38.6 % (ref 35–47)
HGB BLD-MCNC: 12.6 G/DL (ref 11.7–15.7)
IMM GRANULOCYTES # BLD: 0 10E9/L (ref 0–0.4)
IMM GRANULOCYTES NFR BLD: 0.2 %
INTERPRETATION ECG - MUSE: NORMAL
LYMPHOCYTES # BLD AUTO: 3.1 10E9/L (ref 0.8–5.3)
LYMPHOCYTES NFR BLD AUTO: 30.9 %
MCH RBC QN AUTO: 25 PG (ref 26.5–33)
MCHC RBC AUTO-ENTMCNC: 32.6 G/DL (ref 31.5–36.5)
MCV RBC AUTO: 77 FL (ref 78–100)
MONOCYTES # BLD AUTO: 0.4 10E9/L (ref 0–1.3)
MONOCYTES NFR BLD AUTO: 4.3 %
NEUTROPHILS # BLD AUTO: 6.4 10E9/L (ref 1.6–8.3)
NEUTROPHILS NFR BLD AUTO: 63.4 %
PLATELET # BLD AUTO: 211 10E9/L (ref 150–450)
POTASSIUM SERPL-SCNC: 3.7 MMOL/L (ref 3.4–5.3)
RBC # BLD AUTO: 5.04 10E12/L (ref 3.8–5.2)
SODIUM SERPL-SCNC: 140 MMOL/L (ref 133–144)
TROPONIN I SERPL-MCNC: <0.015 UG/L (ref 0–0.04)
WBC # BLD AUTO: 10.1 10E9/L (ref 4–11)

## 2018-03-05 PROCEDURE — 25000132 ZZH RX MED GY IP 250 OP 250 PS 637: Performed by: EMERGENCY MEDICINE

## 2018-03-05 PROCEDURE — 80048 BASIC METABOLIC PNL TOTAL CA: CPT | Performed by: EMERGENCY MEDICINE

## 2018-03-05 PROCEDURE — 71046 X-RAY EXAM CHEST 2 VIEWS: CPT

## 2018-03-05 PROCEDURE — 93005 ELECTROCARDIOGRAM TRACING: CPT

## 2018-03-05 PROCEDURE — 25000125 ZZHC RX 250: Performed by: EMERGENCY MEDICINE

## 2018-03-05 PROCEDURE — 99285 EMERGENCY DEPT VISIT HI MDM: CPT | Mod: 25

## 2018-03-05 PROCEDURE — 85025 COMPLETE CBC W/AUTO DIFF WBC: CPT | Performed by: EMERGENCY MEDICINE

## 2018-03-05 PROCEDURE — 84484 ASSAY OF TROPONIN QUANT: CPT | Performed by: EMERGENCY MEDICINE

## 2018-03-05 RX ORDER — IBUPROFEN 600 MG/1
600 TABLET, FILM COATED ORAL ONCE
Status: COMPLETED | OUTPATIENT
Start: 2018-03-05 | End: 2018-03-05

## 2018-03-05 RX ADMIN — IBUPROFEN 600 MG: 600 TABLET ORAL at 11:13

## 2018-03-05 RX ADMIN — LIDOCAINE HYDROCHLORIDE 30 ML: 20 SOLUTION ORAL; TOPICAL at 11:58

## 2018-03-05 ASSESSMENT — ENCOUNTER SYMPTOMS
BACK PAIN: 1
DIZZINESS: 1
CHILLS: 1
ABDOMINAL PAIN: 1
PALPITATIONS: 1
NAUSEA: 1
CHEST TIGHTNESS: 1
SHORTNESS OF BREATH: 0
VOMITING: 1

## 2018-03-05 NOTE — ED AVS SNAPSHOT
"  Emergency Department    [address] [facility] [address]    Phone:  [phone]    Fax:  [phone]                                       Nicolasa Moore   MRN: [id number]    Department:   Emergency Department   Date of Visit:  3/5/2018           Patient Information     Date Of Birth          1985        Your diagnoses for this visit were:     Chest pain, unspecified type     Palpitations        You were seen by Jean-Claude Edmonds DO.      Follow-up Information     Follow up with Nyasia Smith MD In 3 days.    Specialty:  Internal Medicine    Contact information:    [address] [facility] [address]  [phone]          Follow up with  Emergency Department.    Specialty:  EMERGENCY MEDICINE    Why:  If symptoms worsen    Contact information:    [address] [facility] [address] [phone]        Discharge Instructions         * Heart Palpitations    Palpitations refers to the feeling that your heart is beating hard, fast or irregular. Some people describe it as \"pounding\" or \"skipped beats\". Palpitations may occur in persons with heart disease, but can also occur in healthy persons. Your doctor does not believe that anything dangerous is causing your symptoms at this time.  Heart-Related Causes:    Arrhythmia (a change from the heart's normal rhythm)    Disease of the heart valves  Non-Heart-Related Causes:    Certain medicines (such as asthma inhalers and decongestants)    Some herbal supplements, energy drinks and pills, and weight loss pills    Illegal stimulant drugs (such as cocaine, crank, methamphetamine, PCP)    Caffeine, alcohol and tobacco    Medical conditions such as thyroid disease, anemia, anxiety and panic disorder  Sometimes the cause cannot be found.  Home Care:  1. Avoid excess caffeine, alcohol, tobacco and any stimulant drugs.  2. Tell your doctor about any prescription or over-the-counter or herbal medicines you " take.  Follow Up  with your doctor or as advised by our staff.  Get Prompt Medical Attention  if any of the following occur together with palpitations:    Weakness, dizziness, light-headed or fainting    Chest pain or shortness of breath    Rapid heart rate (over 120 beats per minute, at rest)    Palpitations that lasts over 20 minutes    Weakness of an arm or leg or one side of the face    Difficulty with speech or vision    4290-7411 [employer]. [address] [place] [address]. All rights reserved. This information is not intended as a substitute for professional medical care. Always follow your healthcare professional's instructions.  This information has been modified by your health care provider with permission from the publisher.         *CHEST PAIN, UNCERTAIN CAUSE    Based on your exam today, the exact cause of your chest pain is not certain. Your condition does not seem serious at this time, and your pain does not appear to be coming from your heart. However, sometimes the signs of a serious problem take more time to appear. Therefore, watch for the warning signs listed below.  HOME CARE:  1. Rest today and avoid strenuous activity.  2. Take any prescribed medicine as directed.  FOLLOW UP with your doctor in 1-3 days.   GET PROMPT MEDICAL ATTENTION if any of the following occur:    A change in the type of pain: if it feels different, becomes more severe, lasts longer, or begins to spread into your shoulder, arm, neck, jaw or back    Shortness of breath or increased pain with breathing    Weakness, dizziness, or fainting    Cough with blood or dark colored sputum (phlegm)    Fever over 101  F (38.3  C)    Swelling, pain or redness in one leg    5222-7725 [employer]. [address] [place] [address]. All rights reserved. This information is not intended as a substitute for professional medical care. Always follow your healthcare professional's  instructions.  This information has been modified by your health care provider with permission from the publisher.      Future Appointments        Provider Department Dept Phone Center    3/8/2018 2:45 PM Nyasia Smith MD [facility] [phone]       24 Hour Appointment Hotline       To make an appointment at any [facility], call [phone] ([phone]). If you don't have a family doctor or clinic, we will help you find one. [facility] are conveniently located to serve the needs of you and your family.             Review of your medicines      Our records show that you are taking the medicines listed below. If these are incorrect, please call your family doctor or clinic.        Dose / Directions Last dose taken    ferrous sulfate 325 (65 FE) MG tablet   Commonly known as:  IRON   Dose:  325 mg   Quantity:  100 tablet        Take 1 tablet (325 mg) by mouth daily   Refills:  0        ibuprofen 600 MG tablet   Commonly known as:  ADVIL/MOTRIN   Dose:  600 mg   Quantity:  90 tablet        Take 1 tablet (600 mg) by mouth every 6 hours as needed for other (cramping)   Refills:  0        lanolin ointment   Quantity:  40 g        Apply topically every hour as needed for dry skin (soreness)   Refills:  0        oxyCODONE IR 5 MG tablet   Commonly known as:  ROXICODONE   Dose:  5 mg   Quantity:  20 tablet        Take 1 tablet (5 mg) by mouth every 4 hours as needed for moderate to severe pain   Refills:  0        prenatal multivitamin plus iron 27-0.8 MG Tabs per tablet   Dose:  1 tablet        Take 1 tablet by mouth daily   Refills:  0        senna-docusate 8.6-50 MG per tablet   Commonly known as:  SENOKOT-S;PERICOLACE   Dose:  1-2 tablet   Quantity:  100 tablet        Take 1-2 tablets by mouth 2 times daily   Refills:  0        VITAMIN D (CHOLECALCIFEROL) PO   Dose:  400 Units        Take 400 Units by mouth daily   Refills:  0                Procedures and tests  performed during your visit     Basic metabolic panel    CBC with platelets differential    Cardiac Continuous Monitoring    EKG 12 lead    Troponin I    XR Chest 2 Views      Orders Needing Specimen Collection     None      Pending Results     No orders found from 3/3/2018 to 3/6/2018.            Pending Culture Results     No orders found from 3/3/2018 to 3/6/2018.            Pending Results Instructions     If you had any lab results that were not finalized at the time of your Discharge, you can call the ED Lab Result RN at [phone]. You will be contacted by this team for any positive Lab results or changes in treatment. The nurses are available 7 days a week from 10A to 6:30P.  You can leave a message 24 hours per day and they will return your call.        Test Results From Your Hospital Stay        3/5/2018 10:46 AM      Component Results     Component Value Ref Range & Units Status    WBC 10.1 4.0 - 11.0 10e9/L Final    RBC Count 5.04 3.8 - 5.2 10e12/L Final    Hemoglobin 12.6 11.7 - 15.7 g/dL Final    Hematocrit 38.6 35.0 - 47.0 % Final    MCV 77 (L) 78 - 100 fl Final    MCH 25.0 (L) 26.5 - 33.0 pg Final    MCHC 32.6 31.5 - 36.5 g/dL Final    RDW 16.2 (H) 10.0 - 15.0 % Final    Platelet Count 211 150 - 450 10e9/L Final    Diff Method Automated Method  Final    % Neutrophils 63.4 % Final    % Lymphocytes 30.9 % Final    % Monocytes 4.3 % Final    % Eosinophils 1.0 % Final    % Basophils 0.2 % Final    % Immature Granulocytes 0.2 % Final    Absolute Neutrophil 6.4 1.6 - 8.3 10e9/L Final    Absolute Lymphocytes 3.1 0.8 - 5.3 10e9/L Final    Absolute Monocytes 0.4 0.0 - 1.3 10e9/L Final    Absolute Eosinophils 0.1 0.0 - 0.7 10e9/L Final    Absolute Basophils 0.0 0.0 - 0.2 10e9/L Final    Abs Immature Granulocytes 0.0 0 - 0.4 10e9/L Final         3/5/2018 11:02 AM      Component Results     Component Value Ref Range & Units Status    Sodium 140 133 - 144 mmol/L Final    Potassium 3.7 3.4 - 5.3 mmol/L Final     Chloride 106 94 - 109 mmol/L Final    Carbon Dioxide 27 20 - 32 mmol/L Final    Anion Gap 7 3 - 14 mmol/L Final    Glucose 94 70 - 99 mg/dL Final    Urea Nitrogen 14 7 - 30 mg/dL Final    Creatinine 0.56 0.52 - 1.04 mg/dL Final    GFR Estimate >90 >60 mL/min/1.7m2 Final    Non  GFR Calc    GFR Estimate If Black >90 >60 mL/min/1.7m2 Final    African American GFR Calc    Calcium 9.0 8.5 - 10.1 mg/dL Final         3/5/2018 11:37 AM      Narrative     XR CHEST 2 VW 3/5/2018 11:25 AM    COMPARISON: CT dated 8/24/2017    HISTORY: Chest pain        Impression     IMPRESSION: Cardiac silhouette and pulmonary vasculature are within  normal limits. No focal airspace disease, pleural effusion or  pneumothorax. Convex right curvature with apex at T8-T9.    NIKOLAY TRAN MD         3/5/2018 11:43 AM      Component Results     Component Value Ref Range & Units Status    Troponin I ES <0.015 0.000 - 0.045 ug/L Final    The 99th percentile for upper reference range is 0.045 ug/L.  Troponin values   in the range of 0.045 - 0.120 ug/L may be associated with risks of adverse   clinical events.                  Clinical Quality Measure: Blood Pressure Screening     Your blood pressure was checked while you were in the emergency department today. The last reading we obtained was  BP: 122/88 . Please read the guidelines below about what these numbers mean and what you should do about them.  If your systolic blood pressure (the top number) is less than 120 and your diastolic blood pressure (the bottom number) is less than 80, then your blood pressure is normal. There is nothing more that you need to do about it.  If your systolic blood pressure (the top number) is 120-139 or your diastolic blood pressure (the bottom number) is 80-89, your blood pressure may be higher than it should be. You should have your blood pressure rechecked within a year by a primary care provider.  If your systolic blood pressure (the top number)  is 140 or greater or your diastolic blood pressure (the bottom number) is 90 or greater, you may have high blood pressure. High blood pressure is treatable, but if left untreated over time it can put you at risk for heart attack, stroke, or kidney failure. You should have your blood pressure rechecked by a primary care provider within the next 4 weeks.  If your provider in the emergency department today gave you specific instructions to follow-up with your doctor or provider even sooner than that, you should follow that instruction and not wait for up to 4 weeks for your follow-up visit.        Thank you for choosing [facility]       Thank you for choosing [facility] for your care. Our goal is always to provide you with excellent care. Hearing back from our patients is one way we can continue to improve our services. Please take a few minutes to complete the written survey that you may receive in the mail after you visit with us. Thank you!        [facility] Information     [employer] gives you secure access to your electronic health record. If you see a primary care provider, you can also send messages to your care team and make appointments. If you have questions, please call your primary care clinic.  If you do not have a primary care provider, please call [phone] and they will assist you.        Care EveryWhere ID     This is your Care EveryWhere ID. This could be used by other organizations to access your [facility] medical records  [id number]        Equal Access to Services     PRASANTH AJ : Hadii porter Conley, waaxda luqadaha, qaybta kaalpricilla mirza. So [facility] [phone].    ATENCIÓN: Si habla español, tiene a jimenez disposición servicios gratuitos de asistencia lingüística. Llame al [phone].    We comply with applicable federal civil rights laws and [place] laws. We do not discriminate on the basis of race, color, national origin, age, disability, sex,  sexual orientation, or gender identity.            After Visit Summary       This is your record. Keep this with you and show to your community pharmacist(s) and doctor(s) at your next visit.

## 2018-03-05 NOTE — ED AVS SNAPSHOT
Emergency Department    [address] [facility] [address]    Phone:  [phone]    Fax:  [phone]                                       Nicolasa Moore   MRN: [id number]    Department:   Emergency Department   Date of Visit:  3/5/2018           After Visit Summary Signature Page     I have received my discharge instructions, and my questions have been answered. I have discussed any challenges I see with this plan with the nurse or doctor.    ..........................................................................................................................................  Patient/Patient Representative Signature      ..........................................................................................................................................  Patient Representative Print Name and Relationship to Patient    ..................................................               ................................................  Date                                            Time    ..........................................................................................................................................  Reviewed by Signature/Title    ...................................................              ..............................................  Date                                                            Time

## 2018-03-05 NOTE — TELEPHONE ENCOUNTER
"Nicolasa Moore is a 32 year old female who calls with heart palpitations.    NURSING ASSESSMENT:  Description:  Heart palpitations started at 0730.    Onset/duration:  Onset - yesterday had episode of heart palpitations with light-headedness lasted 10 minutes then resolved on own.    Precip. factors:  History of palpitations.    Associated symptoms:  Feeling light-headed and \"woozy.\"  Nausea with emesis X1.  Slight SOB initially this morning but denies SOB or breathing difficulty now.  L) shoulder blade discomfort, rated 5-6/10.  Discomfort described as \"pressure.\"    Pain scale (0-10)   5-6/10    Last exam/Treatment:  8/24/17  Allergies:   Allergies   Allergen Reactions     Penicillins Itching       MEDICATIONS:   Taking medication(s) as prescribed? N/A  Taking over the counter medication(s?) N/A  Any medication side effects? Not Applicable    Any barriers to taking medication(s) as prescribed?  No  Medication(s) improving/managing symptoms?  N/A  Medication reconciliation completed: N/A      NURSING PLAN: Nursing advice to patient to seek emergency care immediately.      RECOMMENDED DISPOSITION:  To ED/call 911, another person to drive - patient did not want to call 911, but will have family member bring her into ER now.    Will comply with recommendation: Yes  If further questions/concerns or if symptoms do not improve, worsen or new symptoms develop, call your PCP or [facility] as soon as possible.      Guideline used:  Telephone Triage Protocols for Nurses, Fifth Edition, Flavia Ramsay RN    "

## 2018-03-05 NOTE — ED PROVIDER NOTES
History     Chief Complaint:  Palpitations    HPI   Nicolasa Moore is a 32 year old female who is 4 months post-partum and with a history of palpitations, who presents with to the ED today with palpitations. The patient states that she has had these palpitations, as well as some pressure to her left chest that radiates to her sternum, left shoulder, and back for about a day and a half. She states that she had these symptoms both before and during her pregnancy off and on. She came in to the hospital for her previous symptoms about 8 months ago and had an echo done, which was normal, as well as a chest CT that was negative for PE. She states that her symptoms started yesterday while she was getting her hair cut and she called her primary care physician today since the pain and pressure were still present, and was told to come in to the ED. She states that she had an episode of nausea and vomiting this morning, something that she did not have last year when she had her palpitations. She reports to having a dry cough, some dizziness, and chills but denies any leg swelling. She reports to some upper abdominal pain and some lower back pain with walking. The patient denies any recent travel, surgery, or any hormonal birth control use. She denies taking any Tylenol or Ibuprofen yet today.  She is breast-feeding but denies any redness or swelling to the breast to suggest mastitis.    PE/DVT Risk Factors:  The patient denies a personal or family history of PE, DVT, or other clotting disorders. The patient denies any recent travel, surgery, or other prolonged immobilization. The patient denies tobacco use or hormone use.    Cardiac Risk Factors:  The patient denies a history of diabetes, hypertension, high cholesterol, known cardiac disease or current smoking.    Allergies:  Penicillins - itching     Medications:    Roxicodone   Senna-ducosate  Prenatal vitamin      Past Medical History:    Acute chest pain   Anemia  "  Palpitations     Past Surgical History:     section     Family History:    History reviewed. No pertinent family history.     Social History:  Marital Status:    Presents to the ED alone   Tobacco Use: never smoker  Alcohol Use: no   PCP: Nyasia Smith     Review of Systems   Constitutional: Positive for chills.   Respiratory: Positive for chest tightness. Negative for shortness of breath.    Cardiovascular: Positive for chest pain and palpitations. Negative for leg swelling.   Gastrointestinal: Positive for abdominal pain, nausea and vomiting.   Musculoskeletal: Positive for back pain.   Neurological: Positive for dizziness.   All other systems reviewed and are negative.      Physical Exam   First Vitals:  BP: 145/87  Pulse: 84  Temp: 98  F (36.7  C)  Resp: 16  Height: 170.2 cm (5' 7\")  Weight: 90.7 kg (200 lb)  SpO2: 99 %      Physical Exam  Physical Exam   General:  Sitting on bed.  Sitting comfortable in bed.  Talkative.  No distress.  HENT:  No obvious trauma to head  Right Ear:  External ear normal.   Left Ear:  External ear normal.   Nose:  Nose normal.   Eyes:  Conjunctivae and EOM are normal. Pupils are equal, round, and reactive.   Neck: Normal range of motion. Neck supple. No tracheal deviation present.   CV:  Normal heart sounds. No murmur heard.  Pulm/Chest: Effort normal and breath sounds normal.   Abd: Soft. No distension. There is no tenderness. There is no rigidity, no rebound and no guarding.   M/S: Normal range of motion. No calf pain or swelling  Neuro: Alert. GCS 15.  Skin: Skin is warm and dry. No rash noted. Not diaphoretic.   Psych: Normal mood and affect. Behavior is normal.    Emergency Department Course   ECG:  @ 1003  Indication: Palpitations   Vent. Rate 98 bpm. MD interval 144 ms. QRS duration 82 ms. QT/QTc 340/434 ms. P-R-T axis 30 24 11.   Normal sinus rhythm. Normal ECG.  No significant change when compared to previous ECG from 17   Read @ 1004 by Dr." Grey.     Imaging:  Radiographic findings were communicated with the patient who voiced understanding of the findings.  Chest Pa and Lat xray: Cardiac silhouette and pulmonary vasculature are within normal limits. No focal airspace disease, pleural effusion or pneumothorax. Convex right curvature with apex at T8-T9.    Laboratory:  CBC:  WBC 10.1, HGB 12.6,   BMP:  WNL (Creatinine 0.56)   Troponin: < 0.015    Interventions:  1113 Motrin 600 mg PO  1158 GI Cocktail     Emergency Department Course:  Nursing notes and vitals reviewed.  (1104) I performed an exam of the patient as documented above.    EKG was done, interpretation as above.   A peripheral IV was established. Blood was drawn from the patient. This was sent for laboratory testing, findings above.      1220: Reassess patient.  The ibuprofen did not help her pain very much.  The patient did feel better after the GI cocktail.    Findings and plan explained to the patient. Patient discharged home with instructions regarding supportive care, medications, and reasons to return. The importance of close follow-up was reviewed. The patient was recommended to take Zantac 150 mg twice a day for the next week along with a modified bland diet.    Impression & Plan    Medical Decision Making:  Nicolasa Moore is a very pleasant 32 year old year old patient who presents to the emergency department with concern of chest pain and palpitations.  At this time I do not suspect that there is an acute/dangerous pathology for the chest pain.  They have no significant personal/family history of cardiac disease. They have no significant cardiac risk factors.  The EKG was reviewed and shows no evidence of Brugada syndrome, Cheng-Parkinson-White, hypertrophic cardiomyopathy or prolonged QTc syndrome.  No significant ST changes to suggest myocardial infarction. The chest xray was reviewed and shows no evidence of pneumothorax, infiltrate to suggest pneumonia, widened  mediastinum to suggest aortic dissection, obvious rib fracture or free air under the diaphragm to suggest perforated viscous ulcer. Their troponin was negative after 2 days of symptoms. The patient does not smoke and is otherwise PERC negative. There are no focal infiltrates, effusions, pulmonary edema, or evidence of PTX on CXR. The patient has not had recent fevers or viral infections to suggest pericarditis.  They are at low risk for aortic pathology and have normal aortic contour on CXR.  The patient was maintained on the monitor for a few hours and had no arrhythmias.  An occasional PVC was seen.  The patient has a 4-month-old baby at home.  I consider this could be musculoskeletal from carrying a child.  She did not have much relief from the ibuprofen.  I provided her a GI cocktail and that lessen the pain moderately.  I suspect that this is dyspepsia.  She will take Zantac 150 mg twice a day for the next week along with a modified diet.  She will follow-up with her primary care physician.  There is no peripheral edema or signs of pulmonary edema on the chest x-ray to suggest postpartum cardiomyopathy.  They have not had recent chest trauma.  All of this was discussed with the patient and they were reassurred. I have advised them to follow-up with their PCP in the next 3 days to get further evaluation, and to return to the ED sooner if their chest pain continues/worsens, they develop severe SOB/fevers/lightheadedness, or they develop any other new and concerning symptoms. At this point the patient is stable and appropriate for discharge.    The treatment plan was discussed with the patient and they expressed understanding of this plan and consented to the plan.  In addition, the patient will return to the emergency department if their symptoms persist, worsen, if new symptoms arise or if there is any concern as other pathology may be present that is not evident at this time. They also understand the importance  of close follow up in the clinic and if unable to do so will return to the emergency department for a reevaluation. All questions were answered.      The treatment plan was discussed with the patient and they expressed understanding of this plan and consented to the plan.  In addition, the patient will return to the emergency department if their symptoms persist, worsen, if new symptoms arise or if there is any concern as other pathology may be present that is not evident at this time. They also understand the importance of close follow up in the clinic and if unable to do so will return to the emergency department for a reevaluation. All questions were answered.    Diagnosis:    ICD-10-CM    1. Chest pain, unspecified type R07.9    2. Palpitations R00.2        Disposition:  discharged to home    Discharge Medications:  New Prescriptions    No medications on file         Doreen TUCKER am serving as a scribe on 3/5/2018 at 11:04 AM to personally document services performed by Dr. Jean-Claude Edmonds based on my observations and the provider's statements to me.    3/5/2018    EMERGENCY DEPARTMENT       Jean-Claude Edmonds,   03/05/18 1225

## 2018-03-05 NOTE — DISCHARGE INSTRUCTIONS
"  * Heart Palpitations    Palpitations refers to the feeling that your heart is beating hard, fast or irregular. Some people describe it as \"pounding\" or \"skipped beats\". Palpitations may occur in persons with heart disease, but can also occur in healthy persons. Your doctor does not believe that anything dangerous is causing your symptoms at this time.  Heart-Related Causes:    Arrhythmia (a change from the heart's normal rhythm)    Disease of the heart valves  Non-Heart-Related Causes:    Certain medicines (such as asthma inhalers and decongestants)    Some herbal supplements, energy drinks and pills, and weight loss pills    Illegal stimulant drugs (such as cocaine, crank, methamphetamine, PCP)    Caffeine, alcohol and tobacco    Medical conditions such as thyroid disease, anemia, anxiety and panic disorder  Sometimes the cause cannot be found.  Home Care:  1. Avoid excess caffeine, alcohol, tobacco and any stimulant drugs.  2. Tell your doctor about any prescription or over-the-counter or herbal medicines you take.  Follow Up  with your doctor or as advised by our staff.  Get Prompt Medical Attention  if any of the following occur together with palpitations:    Weakness, dizziness, light-headed or fainting    Chest pain or shortness of breath    Rapid heart rate (over 120 beats per minute, at rest)    Palpitations that lasts over 20 minutes    Weakness of an arm or leg or one side of the face    Difficulty with speech or vision    8494-7760 [employer]. [address] [place] [address]. All rights reserved. This information is not intended as a substitute for professional medical care. Always follow your healthcare professional's instructions.  This information has been modified by your health care provider with permission from the publisher.         *CHEST PAIN, UNCERTAIN CAUSE    Based on your exam today, the exact cause of your chest pain is not certain. Your condition does not seem " serious at this time, and your pain does not appear to be coming from your heart. However, sometimes the signs of a serious problem take more time to appear. Therefore, watch for the warning signs listed below.  HOME CARE:  1. Rest today and avoid strenuous activity.  2. Take any prescribed medicine as directed.  FOLLOW UP with your doctor in 1-3 days.   GET PROMPT MEDICAL ATTENTION if any of the following occur:    A change in the type of pain: if it feels different, becomes more severe, lasts longer, or begins to spread into your shoulder, arm, neck, jaw or back    Shortness of breath or increased pain with breathing    Weakness, dizziness, or fainting    Cough with blood or dark colored sputum (phlegm)    Fever over 101  F (38.3  C)    Swelling, pain or redness in one leg    8649-0911 [employer]. [address] [place] [address]. All rights reserved. This information is not intended as a substitute for professional medical care. Always follow your healthcare professional's instructions.  This information has been modified by your health care provider with permission from the publisher.

## 2018-05-15 ENCOUNTER — APPOINTMENT (OUTPATIENT)
Dept: MRI IMAGING | Facility: CLINIC | Age: 33
End: 2018-05-15
Attending: EMERGENCY MEDICINE
Payer: COMMERCIAL

## 2018-05-15 ENCOUNTER — HOSPITAL ENCOUNTER (EMERGENCY)
Facility: CLINIC | Age: 33
Discharge: HOME | End: 2018-05-15
Attending: EMERGENCY MEDICINE | Admitting: EMERGENCY MEDICINE
Payer: COMMERCIAL

## 2018-05-15 VITALS
HEIGHT: 67 IN | RESPIRATION RATE: 18 BRPM | SYSTOLIC BLOOD PRESSURE: 123 MMHG | WEIGHT: 219.6 LBS | DIASTOLIC BLOOD PRESSURE: 54 MMHG | OXYGEN SATURATION: 100 % | TEMPERATURE: 98.4 F | BODY MASS INDEX: 34.47 KG/M2

## 2018-05-15 DIAGNOSIS — M54.12 CERVICAL RADICULOPATHY: ICD-10-CM

## 2018-05-15 LAB
ANION GAP SERPL CALCULATED.3IONS-SCNC: 7 MMOL/L (ref 3–14)
BASOPHILS # BLD AUTO: 0 10E9/L (ref 0–0.2)
BASOPHILS NFR BLD AUTO: 0 %
BUN SERPL-MCNC: 12 MG/DL (ref 7–30)
CALCIUM SERPL-MCNC: 9 MG/DL (ref 8.5–10.1)
CHLORIDE SERPL-SCNC: 105 MMOL/L (ref 94–109)
CO2 SERPL-SCNC: 28 MMOL/L (ref 20–32)
CREAT SERPL-MCNC: 0.54 MG/DL (ref 0.52–1.04)
DIFFERENTIAL METHOD BLD: ABNORMAL
EOSINOPHIL # BLD AUTO: 0.2 10E9/L (ref 0–0.7)
EOSINOPHIL NFR BLD AUTO: 2 %
ERYTHROCYTE [DISTWIDTH] IN BLOOD BY AUTOMATED COUNT: 15 % (ref 10–15)
GFR SERPL CREATININE-BSD FRML MDRD: >90 ML/MIN/1.7M2
GLUCOSE SERPL-MCNC: 93 MG/DL (ref 70–99)
HCG UR QL: NEGATIVE
HCT VFR BLD AUTO: 38.6 % (ref 35–47)
HGB BLD-MCNC: 12.6 G/DL (ref 11.7–15.7)
LYMPHOCYTES # BLD AUTO: 2.9 10E9/L (ref 0.8–5.3)
LYMPHOCYTES NFR BLD AUTO: 28 %
MCH RBC QN AUTO: 25.8 PG (ref 26.5–33)
MCHC RBC AUTO-ENTMCNC: 32.6 G/DL (ref 31.5–36.5)
MCV RBC AUTO: 79 FL (ref 78–100)
MONOCYTES # BLD AUTO: 0.6 10E9/L (ref 0–1.3)
MONOCYTES NFR BLD AUTO: 6 %
NEUTROPHILS # BLD AUTO: 6.5 10E9/L (ref 1.6–8.3)
NEUTROPHILS NFR BLD AUTO: 64 %
PLATELET # BLD AUTO: 234 10E9/L (ref 150–450)
PLATELET # BLD EST: ABNORMAL 10*3/UL
POTASSIUM SERPL-SCNC: 3.9 MMOL/L (ref 3.4–5.3)
RBC # BLD AUTO: 4.89 10E12/L (ref 3.8–5.2)
RBC MORPH BLD: ABNORMAL
SODIUM SERPL-SCNC: 140 MMOL/L (ref 133–144)
TROPONIN I SERPL-MCNC: <0.015 UG/L (ref 0–0.04)
WBC # BLD AUTO: 10.2 10E9/L (ref 4–11)

## 2018-05-15 PROCEDURE — 25000128 H RX IP 250 OP 636: Performed by: EMERGENCY MEDICINE

## 2018-05-15 PROCEDURE — 96361 HYDRATE IV INFUSION ADD-ON: CPT

## 2018-05-15 PROCEDURE — 85025 COMPLETE CBC W/AUTO DIFF WBC: CPT | Performed by: EMERGENCY MEDICINE

## 2018-05-15 PROCEDURE — 70553 MRI BRAIN STEM W/O & W/DYE: CPT

## 2018-05-15 PROCEDURE — 84484 ASSAY OF TROPONIN QUANT: CPT | Performed by: EMERGENCY MEDICINE

## 2018-05-15 PROCEDURE — 81025 URINE PREGNANCY TEST: CPT | Performed by: EMERGENCY MEDICINE

## 2018-05-15 PROCEDURE — 96360 HYDRATION IV INFUSION INIT: CPT

## 2018-05-15 PROCEDURE — 80048 BASIC METABOLIC PNL TOTAL CA: CPT | Performed by: EMERGENCY MEDICINE

## 2018-05-15 PROCEDURE — 25000132 ZZH RX MED GY IP 250 OP 250 PS 637: Performed by: EMERGENCY MEDICINE

## 2018-05-15 PROCEDURE — 99285 EMERGENCY DEPT VISIT HI MDM: CPT | Mod: 25

## 2018-05-15 PROCEDURE — A9585 GADOBUTROL INJECTION: HCPCS | Performed by: EMERGENCY MEDICINE

## 2018-05-15 RX ORDER — GADOBUTROL 604.72 MG/ML
10 INJECTION INTRAVENOUS ONCE
Status: COMPLETED | OUTPATIENT
Start: 2018-05-15 | End: 2018-05-15

## 2018-05-15 RX ORDER — IBUPROFEN 600 MG/1
600 TABLET, FILM COATED ORAL EVERY 6 HOURS PRN
Qty: 40 TABLET | Refills: 0 | Status: SHIPPED | OUTPATIENT
Start: 2018-05-15 | End: 2018-05-25

## 2018-05-15 RX ORDER — ACETAMINOPHEN 325 MG/1
650 TABLET ORAL ONCE
Status: COMPLETED | OUTPATIENT
Start: 2018-05-15 | End: 2018-05-15

## 2018-05-15 RX ADMIN — GADOBUTROL 10 ML: 604.72 INJECTION INTRAVENOUS at 12:41

## 2018-05-15 RX ADMIN — SODIUM CHLORIDE 1000 ML: 9 INJECTION, SOLUTION INTRAVENOUS at 11:30

## 2018-05-15 RX ADMIN — ACETAMINOPHEN 650 MG: 325 TABLET ORAL at 11:49

## 2018-05-15 ASSESSMENT — ENCOUNTER SYMPTOMS
NUMBNESS: 1
ARTHRALGIAS: 1
LIGHT-HEADEDNESS: 1
NECK PAIN: 1

## 2018-05-15 NOTE — ED AVS SNAPSHOT
Emergency Department    [address] [facility] [address]    Phone:  [phone]    Fax:  [phone]                                       Nicolasa Moore   MRN: [id number]    Department:   Emergency Department   Date of Visit:  5/15/2018           Patient Information     Date Of Birth          1985        Your diagnoses for this visit were:     Cervical radiculopathy        You were seen by Joanna Ferguson MD.      Follow-up Information     Follow up with Nyasia Smith MD In 3 days.    Specialty:  Internal Medicine    Contact information:    [address] [facility] [address]  [phone]          Follow up with Mark Gibbs MD.    Specialty:  Orthopedics    Why:  As needed, spine surgery    Contact information:    [facility]  [address] [facility] [address]  [phone]          Follow up with  Emergency Department.    Specialty:  EMERGENCY MEDICINE    Why:  If symptoms worsen    Contact information:    [address] [facility] [address] [phone]        Discharge Instructions       Try to rest the left neck/arm.  Use NSAIDs for pain and anti-inflammatory properties every 6 hours.    Follow-up with your primary care provider and I have also provided referral to spine surgeon if your symptoms are worsening to discuss further testing and treatment options such as physical therapy.    Return emergency department with severe symptoms or new concerns.    Discharge References/Attachments     CERVICAL RADICULOPATHY, UNDERSTANDING (ENGLISH)    NECK PROBLEMS: RELIEVING YOUR SYMPTOMS (ENGLISH)      24 Hour Appointment Hotline       To make an appointment at any [facility], call [phone] ([phone]). If you don't have a family doctor or clinic, we will help you find one. [facility] are conveniently located to serve the needs of you and your family.             Review of your medicines      CONTINUE these medicines  which may have CHANGED, or have new prescriptions. If we are uncertain of the size of tablets/capsules you have at home, strength may be listed as something that might have changed.        Dose / Directions Last dose taken    ibuprofen 600 MG tablet   Commonly known as:  ADVIL/MOTRIN   Dose:  600 mg   What changed:  reasons to take this   Quantity:  40 tablet        Take 1 tablet (600 mg) by mouth every 6 hours as needed for moderate pain   Refills:  0          Our records show that you are taking the medicines listed below. If these are incorrect, please call your family doctor or clinic.        Dose / Directions Last dose taken    FISH OIL PO        Refills:  0        prenatal multivitamin plus iron 27-0.8 MG Tabs per tablet   Dose:  1 tablet        Take 1 tablet by mouth daily   Refills:  0        VITAMIN D (CHOLECALCIFEROL) PO   Dose:  400 Units        Take 400 Units by mouth daily   Refills:  0                Prescriptions were sent or printed at these locations (1 Prescription)                   Other Prescriptions                Printed at Department/Unit printer (1 of 1)         ibuprofen (ADVIL/MOTRIN) 600 MG tablet                Procedures and tests performed during your visit     Basic metabolic panel    CBC with platelets differential    HCG qualitative urine (UPT)    MR Brain w/o & w Contrast    Troponin I      Orders Needing Specimen Collection     None      Pending Results     No orders found from 5/13/2018 to 5/16/2018.            Pending Culture Results     No orders found from 5/13/2018 to 5/16/2018.            Pending Results Instructions     If you had any lab results that were not finalized at the time of your Discharge, you can call the ED Lab Result RN at [phone]. You will be contacted by this team for any positive Lab results or changes in treatment. The nurses are available 7 days a week from 10A to 6:30P.  You can leave a message 24 hours per day and they will return your call.         Test Results From Your Hospital Stay        5/15/2018 12:47 PM      Narrative     MRI BRAIN WITHOUT AND WITH CONTRAST  5/15/2018 12:40 PM    HISTORY:  Paresthesias and visual difficulty.     TECHNIQUE:  Multiplanar, multisequence MRI of the brain without and  with 10mL Gadavist    COMPARISON: None.    FINDINGS:  The brain parenchyma, ventricles and subarachnoid spaces  appear normal.  There is no evidence of hemorrhage, mass, acute  infarct, or anomaly.  There are no gadolinium enhancing lesions.    The facial structures appear normal. The arteries at the base of the  brain and the dural venous sinuses appear patent. There is a cyst or  polyp in right maxillary antrum.        Impression     IMPRESSION: Normal MRI of the brain.      DAJUAN BUNN MD         5/15/2018 11:59 AM      Component Results     Component Value Ref Range & Units Status    HCG Qual Urine Negative NEG^Negative Final    This test is for screening purposes.  Results should be interpreted along with   the clinical picture.  Confirmation testing is available if warranted by   ordering [id number], HCG Quantitative Pregnancy.           5/15/2018 12:18 PM      Component Results     Component Value Ref Range & Units Status    Troponin I ES <0.015 0.000 - 0.045 ug/L Final    The 99th percentile for upper reference range is 0.045 ug/L.  Troponin values   in the range of 0.045 - 0.120 ug/L may be associated with risks of adverse   clinical events.           5/15/2018 12:14 PM      Component Results     Component Value Ref Range & Units Status    Sodium 140 133 - 144 mmol/L Final    Potassium 3.9 3.4 - 5.3 mmol/L Final    Chloride 105 94 - 109 mmol/L Final    Carbon Dioxide 28 20 - 32 mmol/L Final    Anion Gap 7 3 - 14 mmol/L Final    Glucose 93 70 - 99 mg/dL Final    Urea Nitrogen 12 7 - 30 mg/dL Final    Creatinine 0.54 0.52 - 1.04 mg/dL Final    GFR Estimate >90 >60 mL/min/1.7m2 Final    Non  GFR Calc    GFR Estimate If Black >90 >60  mL/min/1.7m2 Final     GFR Calc    Calcium 9.0 8.5 - 10.1 mg/dL Final         5/15/2018 12:54 PM      Component Results     Component Value Ref Range & Units Status    WBC 10.2 4.0 - 11.0 10e9/L Final    RBC Count 4.89 3.8 - 5.2 10e12/L Final    Hemoglobin 12.6 11.7 - 15.7 g/dL Final    Hematocrit 38.6 35.0 - 47.0 % Final    MCV 79 78 - 100 fl Final    MCH 25.8 (L) 26.5 - 33.0 pg Final    MCHC 32.6 31.5 - 36.5 g/dL Final    RDW 15.0 10.0 - 15.0 % Final    Platelet Count 234 150 - 450 10e9/L Final    Diff Method Manual Differential  Final    % Neutrophils 64.0 % Final    % Lymphocytes 28.0 % Final    % Monocytes 6.0 % Final    % Eosinophils 2.0 % Final    % Basophils 0.0 % Final    Absolute Neutrophil 6.5 1.6 - 8.3 10e9/L Final    Absolute Lymphocytes 2.9 0.8 - 5.3 10e9/L Final    Absolute Monocytes 0.6 0.0 - 1.3 10e9/L Final    Absolute Eosinophils 0.2 0.0 - 0.7 10e9/L Final    Absolute Basophils 0.0 0.0 - 0.2 10e9/L Final    RBC Morphology   Final    Consistent with reported results    Platelet Estimate   Final    Automated count confirmed.  Platelet morphology is normal.                Clinical Quality Measure: Blood Pressure Screening     Your blood pressure was checked while you were in the emergency department today. The last reading we obtained was  BP: 123/54 . Please read the guidelines below about what these numbers mean and what you should do about them.  If your systolic blood pressure (the top number) is less than 120 and your diastolic blood pressure (the bottom number) is less than 80, then your blood pressure is normal. There is nothing more that you need to do about it.  If your systolic blood pressure (the top number) is 120-139 or your diastolic blood pressure (the bottom number) is 80-89, your blood pressure may be higher than it should be. You should have your blood pressure rechecked within a year by a primary care provider.  If your systolic blood pressure (the top number) is  140 or greater or your diastolic blood pressure (the bottom number) is 90 or greater, you may have high blood pressure. High blood pressure is treatable, but if left untreated over time it can put you at risk for heart attack, stroke, or kidney failure. You should have your blood pressure rechecked by a primary care provider within the next 4 weeks.  If your provider in the emergency department today gave you specific instructions to follow-up with your doctor or provider even sooner than that, you should follow that instruction and not wait for up to 4 weeks for your follow-up visit.        Thank you for choosing [facility]       Thank you for choosing [facility] for your care. Our goal is always to provide you with excellent care. Hearing back from our patients is one way we can continue to improve our services. Please take a few minutes to complete the written survey that you may receive in the mail after you visit with us. Thank you!        [employer] Information     [employer] gives you secure access to your electronic health record. If you see a primary care provider, you can also send messages to your care team and make appointments. If you have questions, please call your primary care clinic.  If you do not have a primary care provider, please call [phone] and they will assist you.        Care EveryWhere ID     This is your Care EveryWhere ID. This could be used by other organizations to access your [facility] medical records  [id number]        Equal Access to Services     PRASANTH AJ : Hadii porter Conley, waaxda luqadaha, qaybta kaalmada balbina, pricilla pérez. So [facility] [phone].    ATENCIÓN: Si habla español, tiene a jimenez disposición servicios gratuitos de asistencia lingüística. Llame al [phone].    We comply with applicable federal civil rights laws and [place] laws. We do not discriminate on the basis of race, color, national origin, age, disability, sex,  sexual orientation, or gender identity.            After Visit Summary       This is your record. Keep this with you and show to your community pharmacist(s) and doctor(s) at your next visit.

## 2018-05-15 NOTE — ED AVS SNAPSHOT
Emergency Department    [address] [facility] [address]    Phone:  [phone]    Fax:  [phone]                                       Nicolasa Moore   MRN: [id number]    Department:   Emergency Department   Date of Visit:  5/15/2018           After Visit Summary Signature Page     I have received my discharge instructions, and my questions have been answered. I have discussed any challenges I see with this plan with the nurse or doctor.    ..........................................................................................................................................  Patient/Patient Representative Signature      ..........................................................................................................................................  Patient Representative Print Name and Relationship to Patient    ..................................................               ................................................  Date                                            Time    ..........................................................................................................................................  Reviewed by Signature/Title    ...................................................              ..............................................  Date                                                            Time

## 2018-05-15 NOTE — ED PROVIDER NOTES
"  History     Chief Complaint:    Numbness       The history is provided by the patient.      Nicolasa Moore is a left handed 33 year old female who presents with numbness. The patient states that she has had some numbness to the left side of her body for the last three days. She states that the numbness starts in her left ring and little finger, and radiates up her arm. The patient reports that she has some numbness to her left thumb as well radiating towards the wrist. She also reports pressure in her left occipital head/scalp that seems to shoot towards her left eye with palpation. She describes pressure in her left ear and intermittent tinnitus and lightheadedness. Occasionally she has tingling on the left side of her face. She notes discomfort in her left chest/shoulder/neck/upper back and states she has been seen for the chest pain before and was diagnosed with costochondritis. Patient notes symptoms initially were intermittent and over the last 2 days are more \"noticeable.\"  She called her clinic to try and make an appointment, but was told to come in to the ED to be evaluated. The patient states that she also has some tingling to her left toes, but reports that she has had this for quite some time now. She denies any numbness or tingling to her right extremities. Denies other concerns. She states that her chiropractor believes that she could have a pinched nerve because of herself-reported scoliosis, but she reports that she has not seen him since the onset of these symptoms. Of note, patient is 6 months postpartum. She is breastfeeding.     Allergies:  Penicillins - itching      Medications:    Ibuprofen   Fish oil   Prenatal vitamins   Vitamin D      Past Medical History:    Acute chest pain   Anemia   Palpitations     Past Surgical History:     section     Family History:    History reviewed. No pertinent family history.     Social History:  Marital Status:    Presents to the ED " "alone   Tobacco Use: never smoker   Alcohol Use: yes   PCP: Nyasia Smith     Review of Systems   HENT: Positive for ear pain and tinnitus.    Cardiovascular: Positive for chest pain.   Musculoskeletal: Positive for arthralgias (left shoulder) and neck pain (left lateral neck).   Neurological: Positive for light-headedness and numbness.   All other systems reviewed and are negative.      Physical Exam     Patient Vitals for the past 24 hrs:   BP Temp Temp src Heart Rate Resp SpO2 Height Weight   05/15/18 1025 (!) 129/106 98.4  F (36.9  C) Oral 91 18 100 % 1.702 m (5' 7\") 99.6 kg (219 lb 9.6 oz)        Physical Exam  General: Well-developed and well-nourished. Well appearing young  woman. Cooperative.  Head:  Atraumatic.  Eyes:  Conjunctivae, lids, and sclerae are normal.  ENT:    Normal nose. Moist mucous membranes. Normal TMs bilaterally.  Neck:  Supple. Normal range of motion. Mild tenderness over the left superior paraspinal musculature and left occipital/parietal scalp.  CV:  Regular rate and rhythm. Normal heart sounds with no murmurs, rubs, or gallops detected.  Resp:  No respiratory distress. Clear to auscultation bilaterally without decreased breath sounds, wheezing, rales, or rhonchi.  GI:  Soft. Non-distended. Non-tender.    MS:  Normal ROM. No bilateral lower extremity edema.  Skin:  Warm. Non-diaphoretic. No pallor.  Neuro: Awake. A&Ox3.     Strength 5/5 bilateral upper and lower extremities.    No pronator drift.    Sensation intact to light touch with exception of the left upper extremity.  Subjective decreased sensation to light touch overlying the left fourth and fifth digits as well as the posterolateral aspect and lateral portions of the thenar eminence of the thumb.  Decreased sensation to light touch extends in a dermatomal distribution along the medial aspect of the left upper extremity to the upper arm but also extends proximally to wrist from the thenar eminence of the thumb.  " Decreased sensation to light touch just anterior to the left ear.    No facial droop.    No aphasia.    PERRL. EOMI.    No visual field deficits.    No dysmetria.    No dysdiadochokinesis.   Psych: Normal mood and affect. Normal speech.  Vitals reviewed.    Emergency Department Course   Imaging:  MRI brain w/o and w contrast   Normal MRI of the brain.  Report per radiology.   Radiographic findings were communicated with the patient who voiced understanding of the findings.    Laboratory:  Troponin I: <0.015    BMP: WNL (Creatinine 0.54)   CBC:  WBC 10.2, HGB 12.6,   HCG Qualitative Pregnancy (1120): Negative.     Interventions:  (1130) Normal Saline, 1 liter, IV bolus   (1149) Tylenol 650 mg, PO   (1241) Gadavist 10 mLs, IV      Emergency Department Course:  Nursing notes and vitals reviewed.  (1057) I performed an exam of the patient as documented above.  A peripheral IV was established. Blood was drawn from the patient. This was sent for laboratory testing, findings above.    Urine sample was obtained and sent for laboratory analysis, findings above.   The patient was sent for a brain MRI while in the emergency department, findings above.     (1303) I rechecked on the patient and updated her on results.    Findings and plan explained to the patient. Patient discharged home with instructions regarding supportive care, medications, and reasons to return. The importance of close follow-up was reviewed. The patient was prescribed ibuprofen.    Impression & Plan    Medical Decision Making:  Nicolasa is a 33 year old male old woman who presents with paresthesias of her left upper extremity primarily along the C8 dermatome, but she also notes paresthesias in her thumb and lateral aspect of the forearm as well as paresthesias of the face and pain in her occipital/parietal scalp.  She has only mild neck pain which she describes as discomfort though she notes she is left-handed and has a 6-month-old at home she is  breastfeeding.  Although I favor cervical radiculopathy as the patient's etiology, the distribution of her paresthesias does not follow a single dermatome map with the involvement of the face and thumb.  She also has a myriad of other vague symptoms including lightheadedness. This raises concern for other acute intracranial pathology such as demyelinating disorder like multiple sclerosis.  Thus, MRI of the brain was performed and fortunately does not reveal any acute pathology including masses, ischemia, or demyelination.  Laboratory studies are unremarkable including negative troponin and pregnancy test.  She was given Tylenol for her neck and head pain and upon repeat evaluation states she is feeling well.  I discussed with patient diagnosis of cervical radiculopathy and recommended she try to rest the neck and left arm and favor her right upper extremity as needed for lifting, for example of her infant.  I recommended she use NSAIDs not only for pain but also for anti-inflammatory properties including ibuprofen every 6 hours.  I recommended to follow-up with her primary care provider and did also provided referral to spine surgery in case her symptoms are worsening or she has new symptoms to discuss further testing or treatment options such as MRI or physical therapy.  I provided return precautions and answered all the patient's questions.  She verbalized understanding and is amenable to discharge.    Of note, patient did endorse chronic chest and upper left back pain though she has been evaluated for this before and it is currently unchanged so further workup or treatment of this is not indicated at this time. Suspect muscular etiology.      Diagnosis:    ICD-10-CM    1. Cervical radiculopathy M54.12        Disposition:  Discharged home    Discharge Medications:  New Prescriptions    IBUPROFEN (ADVIL/MOTRIN) 600 MG TABLET    Take 1 tablet (600 mg) by mouth every 6 hours as needed for moderate pain         I,  Doreen Artis, amanda serving as a scribe on 5/15/2018 at 10:57 AM to personally document services performed by Dr. Ferguson based on my observations and the provider's statements to me.    5/15/2018    EMERGENCY DEPARTMENT       Joanna Ferguson MD  05/15/18 [id number]

## 2018-05-15 NOTE — DISCHARGE INSTRUCTIONS
Try to rest the left neck/arm.  Use NSAIDs for pain and anti-inflammatory properties every 6 hours.    Follow-up with your primary care provider and I have also provided referral to spine surgeon if your symptoms are worsening to discuss further testing and treatment options such as physical therapy.    Return emergency department with severe symptoms or new concerns.

## 2018-09-10 ENCOUNTER — HOSPITAL ENCOUNTER (EMERGENCY)
Facility: CLINIC | Age: 33
Discharge: HOME | End: 2018-09-10
Attending: PHYSICIAN ASSISTANT | Admitting: PHYSICIAN ASSISTANT
Payer: COMMERCIAL

## 2018-09-10 ENCOUNTER — APPOINTMENT (OUTPATIENT)
Dept: GENERAL RADIOLOGY | Facility: CLINIC | Age: 33
End: 2018-09-10
Attending: PHYSICIAN ASSISTANT
Payer: COMMERCIAL

## 2018-09-10 VITALS
TEMPERATURE: 97.9 F | SYSTOLIC BLOOD PRESSURE: 115 MMHG | OXYGEN SATURATION: 100 % | BODY MASS INDEX: 33.9 KG/M2 | DIASTOLIC BLOOD PRESSURE: 73 MMHG | RESPIRATION RATE: 15 BRPM | HEIGHT: 67 IN | WEIGHT: 216 LBS

## 2018-09-10 DIAGNOSIS — R00.2 PALPITATIONS: ICD-10-CM

## 2018-09-10 DIAGNOSIS — R07.9 CHEST PAIN, UNSPECIFIED TYPE: ICD-10-CM

## 2018-09-10 LAB
ALBUMIN SERPL-MCNC: 3.7 G/DL (ref 3.4–5)
ALP SERPL-CCNC: 96 U/L (ref 40–150)
ALT SERPL W P-5'-P-CCNC: 18 U/L (ref 0–50)
ANION GAP SERPL CALCULATED.3IONS-SCNC: 7 MMOL/L (ref 3–14)
AST SERPL W P-5'-P-CCNC: 15 U/L (ref 0–45)
BASOPHILS # BLD AUTO: 0 10E9/L (ref 0–0.2)
BASOPHILS NFR BLD AUTO: 0.3 %
BILIRUB SERPL-MCNC: 0.3 MG/DL (ref 0.2–1.3)
BUN SERPL-MCNC: 14 MG/DL (ref 7–30)
CALCIUM SERPL-MCNC: 9.2 MG/DL (ref 8.5–10.1)
CHLORIDE SERPL-SCNC: 108 MMOL/L (ref 94–109)
CO2 SERPL-SCNC: 27 MMOL/L (ref 20–32)
CREAT SERPL-MCNC: 0.54 MG/DL (ref 0.52–1.04)
DIFFERENTIAL METHOD BLD: ABNORMAL
EOSINOPHIL # BLD AUTO: 0.1 10E9/L (ref 0–0.7)
EOSINOPHIL NFR BLD AUTO: 0.9 %
ERYTHROCYTE [DISTWIDTH] IN BLOOD BY AUTOMATED COUNT: 15.3 % (ref 10–15)
GFR SERPL CREATININE-BSD FRML MDRD: >90 ML/MIN/1.7M2
GLUCOSE SERPL-MCNC: 89 MG/DL (ref 70–99)
HCT VFR BLD AUTO: 38.1 % (ref 35–47)
HGB BLD-MCNC: 12.3 G/DL (ref 11.7–15.7)
IMM GRANULOCYTES # BLD: 0 10E9/L (ref 0–0.4)
IMM GRANULOCYTES NFR BLD: 0.3 %
INTERPRETATION ECG - MUSE: NORMAL
LYMPHOCYTES # BLD AUTO: 2.2 10E9/L (ref 0.8–5.3)
LYMPHOCYTES NFR BLD AUTO: 19.4 %
MCH RBC QN AUTO: 25.5 PG (ref 26.5–33)
MCHC RBC AUTO-ENTMCNC: 32.3 G/DL (ref 31.5–36.5)
MCV RBC AUTO: 79 FL (ref 78–100)
MONOCYTES # BLD AUTO: 0.4 10E9/L (ref 0–1.3)
MONOCYTES NFR BLD AUTO: 3.2 %
NEUTROPHILS # BLD AUTO: 8.4 10E9/L (ref 1.6–8.3)
NEUTROPHILS NFR BLD AUTO: 75.9 %
NRBC # BLD AUTO: 0 10*3/UL
NRBC BLD AUTO-RTO: 0 /100
PLATELET # BLD AUTO: 212 10E9/L (ref 150–450)
POTASSIUM SERPL-SCNC: 4.2 MMOL/L (ref 3.4–5.3)
PROT SERPL-MCNC: 8.2 G/DL (ref 6.8–8.8)
RBC # BLD AUTO: 4.83 10E12/L (ref 3.8–5.2)
SODIUM SERPL-SCNC: 142 MMOL/L (ref 133–144)
TROPONIN I SERPL-MCNC: <0.015 UG/L (ref 0–0.04)
WBC # BLD AUTO: 11.1 10E9/L (ref 4–11)

## 2018-09-10 PROCEDURE — 25000132 ZZH RX MED GY IP 250 OP 250 PS 637: Performed by: PHYSICIAN ASSISTANT

## 2018-09-10 PROCEDURE — 84484 ASSAY OF TROPONIN QUANT: CPT | Performed by: PHYSICIAN ASSISTANT

## 2018-09-10 PROCEDURE — 85025 COMPLETE CBC W/AUTO DIFF WBC: CPT | Performed by: PHYSICIAN ASSISTANT

## 2018-09-10 PROCEDURE — 93005 ELECTROCARDIOGRAM TRACING: CPT

## 2018-09-10 PROCEDURE — 25000125 ZZHC RX 250: Performed by: PHYSICIAN ASSISTANT

## 2018-09-10 PROCEDURE — 71046 X-RAY EXAM CHEST 2 VIEWS: CPT

## 2018-09-10 PROCEDURE — 99285 EMERGENCY DEPT VISIT HI MDM: CPT | Mod: 25

## 2018-09-10 PROCEDURE — 80053 COMPREHEN METABOLIC PANEL: CPT | Performed by: PHYSICIAN ASSISTANT

## 2018-09-10 RX ADMIN — LIDOCAINE HYDROCHLORIDE 30 ML: 20 SOLUTION ORAL; TOPICAL at 14:18

## 2018-09-10 ASSESSMENT — ENCOUNTER SYMPTOMS
TREMORS: 1
CHILLS: 1
PALPITATIONS: 1
CHEST TIGHTNESS: 1
DIZZINESS: 1
MYALGIAS: 1
SHORTNESS OF BREATH: 1

## 2018-09-10 NOTE — ED AVS SNAPSHOT
Emergency Department    [address] [facility] [address]    Phone:  [phone]    Fax:  [phone]                                       Nicolasa Moore   MRN: [id number]    Department:   Emergency Department   Date of Visit:  9/10/2018           Patient Information     Date Of Birth          1985        Your diagnoses for this visit were:     Palpitations     Chest pain, unspecified type        You were seen by Elisha Khoury PA-C.      Follow-up Information     Follow up with Nyasia Smith MD In 3 days.    Specialty:  Internal Medicine    Contact information:    [address] [facility] [address]  [phone]          Follow up with  Emergency Department.    Specialty:  EMERGENCY MEDICINE    Why:  As needed, If symptoms worsen    Contact information:    [address] [facility] [address] [phone]        Discharge Instructions       *You may resume diet and activities as tolerated.  Drink plenty of fluids.  Avoid caffeine and alcohol.  *Zantac as prescribed.   *Follow-up with your primary doctor in 2-3 days to discuss Holter monitor.  *Return for worsening chest pain, shortness of breath, lightheadedness, or any other new/concerning symptoms.       Discharge Instructions  Palpitations    Palpitations are an unusual awareness of your heartbeat. People often describe this as the heart skipping, fluttering, racing, irregular, or pounding. At this time, your doctor has found no signs that your palpitations are due to a serious or life-threatening condition. However, sometimes there is a serious problem that does not show up right away. It is important that you follow up with your doctor within 1 week, or as directed by your doctor today, to check for other serious problems. You may need more blood tests, a stress test, heart monitoring, or other tests.    Palpitations can be caused by caffeine, cigarettes, diet pills, energy drinks or supplements,  other stimulants, and medications and street drugs. They can also be caused by anxiety, hormone conditions such as high thyroid, and other medical conditions. Sometimes they are a sign of abnormal rhythm in the heart, so you may need your heart checked.    Return to the Emergency Department if:    You get chest pain or tightness.    You are short of breath.    You get very weak or tired.    You pass out or faint.    Your heart rate is over 120 beats per minute for more than 10 minutes while you are resting.    You have any new symptoms, like fever, cough, numb legs, or you cough up blood.    You have anything else that worries you.    What can I do to help myself?    Fill any prescriptions the doctor gave you and take them right away.     Follow your doctor s instructions about the prescription medicines you are on. Sometimes the doctor may tell you to stop taking a medicine or change the dose.    If you smoke, this may be a good time to quit! The less you can smoke, the better.    Do not use energy drinks, diet pills, or stimulants. Limit your use of caffeine.    Follow up with your doctor:    Within 1 week, or sooner if instructed.    If you keep having palpitations.    If you need help to quit smoking.  If you were given a prescription for medicine here today, be sure to read all of the information (including the package insert) that comes with your prescription.  This will include important information about the medicine, its side effects, and any warnings that you need to know about.  The pharmacist who fills the prescription can provide more information and answer questions you may have about the medicine.  If you have questions or concerns that the pharmacist cannot address, please call or return to the Emergency Department.       Discharge Instructions  Chest Pain    You have been seen today for chest pain or discomfort.  At this time, your provider has found no signs that your chest pain is due to a serious  or life-threatening condition, (or you have declined more testing and/or admission to the hospital). However, sometimes there is a serious problem that does not show up right away. Your evaluation today may not be complete and you may need further testing and evaluation.     Generally, every Emergency Department visit should have a follow-up clinic visit with either a primary or a specialty clinic/provider. Please follow-up as instructed by your emergency provider today.  Return to the Emergency Department if:    Your chest pain changes, gets worse, starts to happen more often, or comes with less activity.    You are newly short of breath.    You get very weak or tired.    You pass out or faint.    You have any new symptoms, like fever, cough, numb legs, or you cough up blood.    You have anything else that worries you.    Until you follow-up with your regular provider, please do the following:    Take one aspirin daily unless you have an allergy or are told not to by your provider.    If a stress test appointment has been made, go to the appointment.    If you have questions, contact your regular provider.    Follow-up with your regular provider/clinic as directed; this is very important.    If you were given a prescription for medicine here today, be sure to read all of the information (including the package insert) that comes with your prescription.  This will include important information about the medicine, its side effects, and any warnings that you need to know about.  The pharmacist who fills the prescription can provide more information and answer questions you may have about the medicine.  If you have questions or concerns that the pharmacist cannot address, please call or return to the Emergency Department.       Remember that you can always come back to the Emergency Department if you are not able to see your regular provider in the amount of time listed above, if you get any new symptoms, or if there is  anything that worries you.         Your next 10 appointments already scheduled     Sep 14, 2018  8:30 AM CDT   PHYSICAL with Nyasia Smith MD   [facility]    [address] [address] [phone]              24 Hour Appointment Hotline       To make an appointment at any [facility], call [phone] ([phone]). If you don't have a family doctor or clinic, we will help you find one. [facility] are conveniently located to serve the needs of you and your family.             Review of your medicines      START taking        Dose / Directions Last dose taken    ranitidine 150 MG tablet   Commonly known as:  ZANTAC   Dose:  150 mg   Quantity:  28 tablet        Take 1 tablet (150 mg) by mouth 2 times daily for 14 days   Refills:  0          Our records show that you are taking the medicines listed below. If these are incorrect, please call your family doctor or clinic.        Dose / Directions Last dose taken    FISH OIL PO        Refills:  0        prenatal multivitamin plus iron 27-0.8 MG Tabs per tablet   Dose:  1 tablet        Take 1 tablet by mouth daily   Refills:  0        VITAMIN D (CHOLECALCIFEROL) PO   Dose:  400 Units        Take 400 Units by mouth daily   Refills:  0                Prescriptions were sent or printed at these locations (1 Prescription)                   Other Prescriptions                Printed at Department/Unit printer (1 of 1)         ranitidine (ZANTAC) 150 MG tablet                Procedures and tests performed during your visit     CBC with platelets differential    Comprehensive metabolic panel    EKG 12 lead    Troponin I    XR Chest 2 Views      Orders Needing Specimen Collection     None      Pending Results     No orders found from 9/8/2018 to 9/11/2018.            Pending Culture Results     No orders found from 9/8/2018 to 9/11/2018.            Pending Results  Instructions     If you had any lab results that were not finalized at the time of your Discharge, you can call the ED Lab Result RN at [phone]. You will be contacted by this team for any positive Lab results or changes in treatment. The nurses are available 7 days a week from 10A to 6:30P.  You can leave a message 24 hours per day and they will return your call.        Test Results From Your Hospital Stay        9/10/2018  3:13 PM      Component Results     Component Value Ref Range & Units Status    Sodium 142 133 - 144 mmol/L Final    Potassium 4.2 3.4 - 5.3 mmol/L Final    Chloride 108 94 - 109 mmol/L Final    Carbon Dioxide 27 20 - 32 mmol/L Final    Anion Gap 7 3 - 14 mmol/L Final    Glucose 89 70 - 99 mg/dL Final    Urea Nitrogen 14 7 - 30 mg/dL Final    Creatinine 0.54 0.52 - 1.04 mg/dL Final    GFR Estimate >90 >60 mL/min/1.7m2 Final    Non  GFR Calc    GFR Estimate If Black >90 >60 mL/min/1.7m2 Final    African American GFR Calc    Calcium 9.2 8.5 - 10.1 mg/dL Final    Bilirubin Total 0.3 0.2 - 1.3 mg/dL Final    Albumin 3.7 3.4 - 5.0 g/dL Final    Protein Total 8.2 6.8 - 8.8 g/dL Final    Alkaline Phosphatase 96 40 - 150 U/L Final    ALT 18 0 - 50 U/L Final    AST 15 0 - 45 U/L Final         9/10/2018  3:13 PM      Component Results     Component Value Ref Range & Units Status    Troponin I ES <0.015 0.000 - 0.045 ug/L Final    The 99th percentile for upper reference range is 0.045 ug/L.  Troponin values   in the range of 0.045 - 0.120 ug/L may be associated with risks of adverse   clinical events.           9/10/2018  2:45 PM      Component Results     Component Value Ref Range & Units Status    WBC 11.1 (H) 4.0 - 11.0 10e9/L Final    RBC Count 4.83 3.8 - 5.2 10e12/L Final    Hemoglobin 12.3 11.7 - 15.7 g/dL Final    Hematocrit 38.1 35.0 - 47.0 % Final    MCV 79 78 - 100 fl Final    MCH 25.5 (L) 26.5 - 33.0 pg Final    MCHC 32.3 31.5 - 36.5 g/dL Final    RDW 15.3 (H) 10.0 - 15.0 %  Final    Platelet Count 212 150 - 450 10e9/L Final    Diff Method Automated Method  Final    % Neutrophils 75.9 % Final    % Lymphocytes 19.4 % Final    % Monocytes 3.2 % Final    % Eosinophils 0.9 % Final    % Basophils 0.3 % Final    % Immature Granulocytes 0.3 % Final    Nucleated RBCs 0 0 /100 Final    Absolute Neutrophil 8.4 (H) 1.6 - 8.3 10e9/L Final    Absolute Lymphocytes 2.2 0.8 - 5.3 10e9/L Final    Absolute Monocytes 0.4 0.0 - 1.3 10e9/L Final    Absolute Eosinophils 0.1 0.0 - 0.7 10e9/L Final    Absolute Basophils 0.0 0.0 - 0.2 10e9/L Final    Abs Immature Granulocytes 0.0 0 - 0.4 10e9/L Final    Absolute Nucleated RBC 0.0  Final         9/10/2018  2:58 PM      Narrative     CHEST TWO VIEWS September 10, 2018 2:47 PM     HISTORY: Chest pain.    COMPARISON: Two view chest x-ray 3/5/2018.    FINDINGS: Moderate sigmoid curvature of the thoracic spine again  noted. The cardiac silhouette, pulmonary vasculature, lungs and  pleural spaces are within normal limits.        Impression     IMPRESSION: Clear lungs.    RYNE MORRIS MD                Clinical Quality Measure: Blood Pressure Screening     Your blood pressure was checked while you were in the emergency department today. The last reading we obtained was  BP: 121/77 . Please read the guidelines below about what these numbers mean and what you should do about them.  If your systolic blood pressure (the top number) is less than 120 and your diastolic blood pressure (the bottom number) is less than 80, then your blood pressure is normal. There is nothing more that you need to do about it.  If your systolic blood pressure (the top number) is 120-139 or your diastolic blood pressure (the bottom number) is 80-89, your blood pressure may be higher than it should be. You should have your blood pressure rechecked within a year by a primary care provider.  If your systolic blood pressure (the top number) is 140 or greater or your diastolic blood pressure (the  bottom number) is 90 or greater, you may have high blood pressure. High blood pressure is treatable, but if left untreated over time it can put you at risk for heart attack, stroke, or kidney failure. You should have your blood pressure rechecked by a primary care provider within the next 4 weeks.  If your provider in the emergency department today gave you specific instructions to follow-up with your doctor or provider even sooner than that, you should follow that instruction and not wait for up to 4 weeks for your follow-up visit.        Thank you for choosing [facility]       Thank you for choosing [facility] for your care. Our goal is always to provide you with excellent care. Hearing back from our patients is one way we can continue to improve our services. Please take a few minutes to complete the written survey that you may receive in the mail after you visit with us. Thank you!        [employer] Information     [employer] gives you secure access to your electronic health record. If you see a primary care provider, you can also send messages to your care team and make appointments. If you have questions, please call your primary care clinic.  If you do not have a primary care provider, please call [phone] and they will assist you.        Care EveryWhere ID     This is your Care EveryWhere ID. This could be used by other organizations to access your [facility] medical records  [id number]        Equal Access to Services     PRASANTH AJ : Avis Conley, waaxda lumarceladaha, qaybta kaalmada balbina, pricilla pérez. So [facility] [phone].    ATENCIÓN: Si habla español, tiene a jimenez disposición servicios gratuitos de asistencia lingüística. Llame al [phone].    We comply with applicable federal civil rights laws and [place] laws. We do not discriminate on the basis of race, color, national origin, age, disability, sex, sexual orientation, or gender identity.            After  Visit Summary       This is your record. Keep this with you and show to your community pharmacist(s) and doctor(s) at your next visit.

## 2018-09-10 NOTE — DISCHARGE INSTRUCTIONS
*You may resume diet and activities as tolerated.  Drink plenty of fluids.  Avoid caffeine and alcohol.  *Zantac as prescribed.   *Follow-up with your primary doctor in 2-3 days to discuss Holter monitor.  *Return for worsening chest pain, shortness of breath, lightheadedness, or any other new/concerning symptoms.       Discharge Instructions  Palpitations    Palpitations are an unusual awareness of your heartbeat. People often describe this as the heart skipping, fluttering, racing, irregular, or pounding. At this time, your doctor has found no signs that your palpitations are due to a serious or life-threatening condition. However, sometimes there is a serious problem that does not show up right away. It is important that you follow up with your doctor within 1 week, or as directed by your doctor today, to check for other serious problems. You may need more blood tests, a stress test, heart monitoring, or other tests.    Palpitations can be caused by caffeine, cigarettes, diet pills, energy drinks or supplements, other stimulants, and medications and street drugs. They can also be caused by anxiety, hormone conditions such as high thyroid, and other medical conditions. Sometimes they are a sign of abnormal rhythm in the heart, so you may need your heart checked.    Return to the Emergency Department if:    You get chest pain or tightness.    You are short of breath.    You get very weak or tired.    You pass out or faint.    Your heart rate is over 120 beats per minute for more than 10 minutes while you are resting.    You have any new symptoms, like fever, cough, numb legs, or you cough up blood.    You have anything else that worries you.    What can I do to help myself?    Fill any prescriptions the doctor gave you and take them right away.     Follow your doctor s instructions about the prescription medicines you are on. Sometimes the doctor may tell you to stop taking a medicine or change the dose.    If you  smoke, this may be a good time to quit! The less you can smoke, the better.    Do not use energy drinks, diet pills, or stimulants. Limit your use of caffeine.    Follow up with your doctor:    Within 1 week, or sooner if instructed.    If you keep having palpitations.    If you need help to quit smoking.  If you were given a prescription for medicine here today, be sure to read all of the information (including the package insert) that comes with your prescription.  This will include important information about the medicine, its side effects, and any warnings that you need to know about.  The pharmacist who fills the prescription can provide more information and answer questions you may have about the medicine.  If you have questions or concerns that the pharmacist cannot address, please call or return to the Emergency Department.       Discharge Instructions  Chest Pain    You have been seen today for chest pain or discomfort.  At this time, your provider has found no signs that your chest pain is due to a serious or life-threatening condition, (or you have declined more testing and/or admission to the hospital). However, sometimes there is a serious problem that does not show up right away. Your evaluation today may not be complete and you may need further testing and evaluation.     Generally, every Emergency Department visit should have a follow-up clinic visit with either a primary or a specialty clinic/provider. Please follow-up as instructed by your emergency provider today.  Return to the Emergency Department if:    Your chest pain changes, gets worse, starts to happen more often, or comes with less activity.    You are newly short of breath.    You get very weak or tired.    You pass out or faint.    You have any new symptoms, like fever, cough, numb legs, or you cough up blood.    You have anything else that worries you.    Until you follow-up with your regular provider, please do the following:    Take  one aspirin daily unless you have an allergy or are told not to by your provider.    If a stress test appointment has been made, go to the appointment.    If you have questions, contact your regular provider.    Follow-up with your regular provider/clinic as directed; this is very important.    If you were given a prescription for medicine here today, be sure to read all of the information (including the package insert) that comes with your prescription.  This will include important information about the medicine, its side effects, and any warnings that you need to know about.  The pharmacist who fills the prescription can provide more information and answer questions you may have about the medicine.  If you have questions or concerns that the pharmacist cannot address, please call or return to the Emergency Department.       Remember that you can always come back to the Emergency Department if you are not able to see your regular provider in the amount of time listed above, if you get any new symptoms, or if there is anything that worries you.

## 2018-09-10 NOTE — ED PROVIDER NOTES
"  History     Chief Complaint:  Chest Pain    HPI   Nicolasa Moore is a 33 year old female who presents for evaluation of chest pain. Last night around 2100, she developed palpitations and they continued throughout the night. She went to work this morning, and became dizzy while driving, and was short of breath at work. She also has been having mid chest tightness and pain, with tingling down her left arm, and pain in her back near her left shoulder blade. Her chest pain increases with deep inspiration. She has discomfort to the back of her head and neck, tremors, and chills. She also felt disoriented today. She thinks that she also may have heartburn, as the discomfort starts low in her chest and travels up toward her throat. She has had a similar episode to this in 2018. Her current episode is different in that the palpitations feel \"stronger\" and she has been having tremors all day which she has not had in the past. She called her PCP this morning who advised her to present to the ED. She does not endorse having any spicy foods, or new and unusual activities for her in the past few days.  Patient does not drink caffeine and no new medications.  Does note she has been under increased stress recently.    Cardiac/PE/DVT Risk Factors:  History of hypertension - Yes, gestational.   History of hyperlipidemia - No  History of diabetes - No  History of smoking - No  Personal history of PE/DVT - No  Family history of PE/DVT - No  Family history of heart complications - No  Recent travel - No  Recent surgery - No  Other immobilizations - No  Cancer - No  Estrogen use - No    Allergies:  Penicillins     Medications:    Fish oil    Past Medical History:    Anemia  Palpitations    Past Surgical History:     section, x2    Family History:    No past pertinent family history.     Social History:  Relationship status:   Tobacco use: No   Alcohol use: Yes, occasionally.  The patient presents with " "family.    Marital Status:   [2]     Review of Systems   Constitutional: Positive for chills.   Respiratory: Positive for chest tightness and shortness of breath.    Cardiovascular: Positive for chest pain and palpitations.   Musculoskeletal: Positive for myalgias.   Neurological: Positive for dizziness and tremors.   All other systems reviewed and are negative.    Physical Exam   Vitals:  Patient Vitals for the past 24 hrs:   BP Temp Temp src Heart Rate Resp SpO2 Height Weight   09/10/18 1530 115/73 - - 86 15 - - -   09/10/18 1515 - - - 86 10 - - -   09/10/18 1419 121/77 - - - - - - -   09/10/18 1305 (!) 136/91 97.9  F (36.6  C) Oral 99 16 100 % 1.702 m (5' 7\") 98 kg (216 lb)        Physical Exam  General: Alert, interactive. GCS 15  Head:  Scalp is atraumatic.  Eyes:  EOM intact. The pupils are equal, round, and reactive to light. No scleral icterus.  ENT:                                      Ears:  The external ears are normal.  Nose:  The external nose is normal.  Throat:  The oropharynx is normal. Mucus membranes are moist.                 Neck:  Normal range of motion. There is no rigidity.   CV:  Regular rate and rhythm. No murmur. 2+ radial pulses.  Reproducible chest wall tenderness.  Resp:  Breath sounds are clear bilaterally. Non-labored, no retractions or accessory muscle use.  GI:  Abdomen is soft, no distension, no tenderness.   MS:  Normal range of motion.   Skin:  Warm and dry.   Neuro:  Strength and sensation grossly intact.   Psych:  Awake. Alert.  Appropriate interactions.     Emergency Department Course     ECG:  ECG taken at 1305, ECG read at 1315 by Dr. Crissy Barlow  Normal sinus rhythm with sinus arrhythmia  Normal ECG  Rate 90 bpm. MO interval 136. QRS duration 78. QT/QTc 352/430. P-R-T axes 36  12  33.     Imaging:  Radiology findings were communicated with the patient who voiced understanding of the findings.    XR Chest 2 views  IMPRESSION:  Clear lungs.  Reading per radiology. "     Laboratory:  Laboratory findings were communicated with the patient who voiced understanding of the findings.  CMP: AWNL (Creatinine 0.54)  Troponin (Collected 1415): <0.015   CBC: WBC: 11.1(H), MCH: 25.5(L), RDW: 15.3(H), Abs neutrophil: 8.4(H) o/w WNL (HGB 12.3, )    Interventions:  1418 GI Cocktail 30 mLs     Emergency Department Course:  Nursing notes and vitals reviewed.  I performed an exam of the patient as documented above.   EKG obtained in the ED, see results above.    IV was inserted and blood was drawn for laboratory testing, results above.  The patient was sent for a XR while in the emergency department, results above.      1531 I rechecked the patient.    Findings and plan explained to the Patient. Patient discharged home with instructions regarding supportive care, medications, and reasons to return. The importance of close follow-up was reviewed.    I personally reviewed the laboratory results with the Patient and answered all related questions prior to discharge.    Impression & Plan      Medical Decision Making:  Nicolasa Moore is a 33 year old female who presents for evaluation of palpitations and chest pain. Vitals normal on arrival. Initial ECG shows no acute ST changes to suggest myocardial infarction.  Troponin approximately 5 hours after onset of chest pain negative, doubt acute coronary syndrome in this low risk patient.  Elevated WBC of 11.1, no signs or symptoms of infection. The EKG reviewed and showed no evidence of SVT, A fib, Brugada syndrome, hypertrophic cardiomyopathy, middleton-parkinson white, pericarditis or prolonged QTc syndrome. The chest xray reviewed and shows no evidence of pneumothorax, infiltrate to suggest pneumonia, or widened mediastinum to suggest aortic dissection.  Patient is PERC negative with no hypoxia; doubt pulmonary embolism.      Patient received a GI cocktail and had significant relief of her symptoms.  She has a history of heartburn and did  note that she was experiencing heartburn and was wondering if this could be contributing to discomfort.  She also has 2 young children at home and has been lifting them frequently, I discussed possibility of muscle soreness given the reproducible chest wall pain and recommended taking ibuprofen with food if symptoms persist.      She was prescribed Zantac 150 mg twice daily for 2 weeks.  She has a follow-up appointment with her primary care provider on Friday and plans to discuss possible Holter monitor. Patient was grateful for the workup and all questions/concerns addressed prior to discharge home.  Return precautions discussed including worsening chest pain, shortness of breath, fever/chills, or any other new/concerning symptoms.      Diagnosis:    ICD-10-CM    1. Palpitations R00.2    2. Chest pain, unspecified type R07.9         Disposition:   Discharged to home    Discharge Medications:  Discharge Medication List as of 9/10/2018  3:36 PM      START taking these medications    Details   ranitidine (ZANTAC) 150 MG tablet Take 1 tablet (150 mg) by mouth 2 times daily for 14 days, Disp-28 tablet, R-0, Local Print           Scribe Disclosure:  I, Bianca Paula, am serving as a scribe at 2:04 PM on 9/10/2018 to document services personally performed by Elisha Khoury PA-C, based on my observations and the provider's statements to me.     Bianca Paula  9/10/2018    EMERGENCY DEPARTMENT       Elisha Khoury PA-C  09/10/18 1936

## 2018-09-10 NOTE — ED AVS SNAPSHOT
Emergency Department    [address] [facility] [address]    Phone:  [phone]    Fax:  [phone]                                       Nicolasa Moore   MRN: [id number]    Department:   Emergency Department   Date of Visit:  9/10/2018           After Visit Summary Signature Page     I have received my discharge instructions, and my questions have been answered. I have discussed any challenges I see with this plan with the nurse or doctor.    ..........................................................................................................................................  Patient/Patient Representative Signature      ..........................................................................................................................................  Patient Representative Print Name and Relationship to Patient    ..................................................               ................................................  Date                                            Time    ..........................................................................................................................................  Reviewed by Signature/Title    ...................................................              ..............................................  Date                                                            Time          22EPIC Rev 08/18

## 2018-09-12 ENCOUNTER — HOSPITAL ENCOUNTER (EMERGENCY)
Facility: CLINIC | Age: 33
Discharge: HOME | End: 2018-09-12
Attending: EMERGENCY MEDICINE | Admitting: EMERGENCY MEDICINE
Payer: COMMERCIAL

## 2018-09-12 ENCOUNTER — NURSE TRIAGE (OUTPATIENT)
Dept: NURSING | Facility: CLINIC | Age: 33
End: 2018-09-12

## 2018-09-12 VITALS
BODY MASS INDEX: 34.28 KG/M2 | DIASTOLIC BLOOD PRESSURE: 83 MMHG | HEIGHT: 67 IN | TEMPERATURE: 98.2 F | RESPIRATION RATE: 18 BRPM | OXYGEN SATURATION: 99 % | SYSTOLIC BLOOD PRESSURE: 117 MMHG | WEIGHT: 218.4 LBS

## 2018-09-12 DIAGNOSIS — R00.2 PALPITATIONS: ICD-10-CM

## 2018-09-12 LAB
D DIMER PPP FEU-MCNC: 0.3 UG/ML FEU (ref 0–0.5)
TROPONIN I SERPL-MCNC: <0.015 UG/L (ref 0–0.04)
TSH SERPL DL<=0.005 MIU/L-ACNC: 1.53 MU/L (ref 0.4–4)

## 2018-09-12 PROCEDURE — 84484 ASSAY OF TROPONIN QUANT: CPT | Performed by: EMERGENCY MEDICINE

## 2018-09-12 PROCEDURE — 84443 ASSAY THYROID STIM HORMONE: CPT | Performed by: EMERGENCY MEDICINE

## 2018-09-12 PROCEDURE — 85379 FIBRIN DEGRADATION QUANT: CPT | Performed by: EMERGENCY MEDICINE

## 2018-09-12 PROCEDURE — 93005 ELECTROCARDIOGRAM TRACING: CPT

## 2018-09-12 PROCEDURE — 99284 EMERGENCY DEPT VISIT MOD MDM: CPT

## 2018-09-12 ASSESSMENT — ENCOUNTER SYMPTOMS
PALPITATIONS: 1
CHILLS: 1
VOMITING: 0
DIZZINESS: 1
ABDOMINAL PAIN: 0
CHEST TIGHTNESS: 1
NAUSEA: 1

## 2018-09-12 NOTE — ED AVS SNAPSHOT
Emergency Department    [address] [facility] [address]    Phone:  [phone]    Fax:  [phone]                                       Nicolasa Moore   MRN: [id number]    Department:   Emergency Department   Date of Visit:  9/12/2018           After Visit Summary Signature Page     I have received my discharge instructions, and my questions have been answered. I have discussed any challenges I see with this plan with the nurse or doctor.    ..........................................................................................................................................  Patient/Patient Representative Signature      ..........................................................................................................................................  Patient Representative Print Name and Relationship to Patient    ..................................................               ................................................  Date                                   Time    ..........................................................................................................................................  Reviewed by Signature/Title    ...................................................              ..............................................  Date                                               Time          22EPIC Rev 08/18

## 2018-09-12 NOTE — ED AVS SNAPSHOT
Emergency Department    [address] [facility] [address]    Phone:  [phone]    Fax:  [phone]                                       Nicolasa Moore   MRN: [id number]    Department:   Emergency Department   Date of Visit:  9/12/2018           Patient Information     Date Of Birth          1985        Your diagnoses for this visit were:     Palpitations        You were seen by Shannan Lazaro MD.      Follow-up Information     Follow up with Nyasia Smith MD.    Specialty:  Internal Medicine    Contact information:    [address] [facility] [address]  [phone]          Follow up with  Emergency Department.    Specialty:  EMERGENCY MEDICINE    Why:  If symptoms worsen    Contact information:    [address] [facility] [address] [phone]        Discharge Instructions       Follow up with your doctor on Friday  Mention the possibility of obtaining holter monitor or other event monitor    Discharge Instructions  Palpitations    Palpitations are an unusual awareness of your heartbeat. People often describe this as the heart skipping, fluttering, racing, irregular, or pounding. At this time, your provider has found no signs that your palpitations are due to a serious or life-threatening condition. However, sometimes there is a serious problem that does not show up right away.    Palpitations can be caused by caffeine, cigarettes, diet pills, energy drinks or supplements, other stimulants, and medications and street drugs. They can also be caused by anxiety, hormone conditions such as high thyroid, and other medical conditions. Sometimes they are a sign of abnormal rhythm in the heart. At this time, your provider did not find any dangerous cause of your symptoms.    Generally, every Emergency Department visit should have a follow-up clinic visit with either a primary or a specialty clinic/provider. Please follow-up as instructed by your  emergency provider today.    Return to the Emergency Department if:    You get chest pain or tightness.    You are short of breath.    You get very weak or tired.    You pass out or faint.    Your heart rate is over 120 beats per minute for more than 10 minutes while you are resting.     You have anything else that worries you.    What can I do to help myself?    Fill any prescriptions the provider gave you and take them right away.     Follow your provider s instructions about the prescription medicines you are on. Sometimes the provider may tell you to stop taking a medicine or change the dose.    If you smoke, this may be a good time to quit! The less you can smoke, the better.    Do not use energy drinks, diet pills, or stimulants. Limit your use of caffeine.  If you were given a prescription for medicine here today, be sure to read all of the information (including the package insert) that comes with your prescription.  This will include important information about the medicine, its side effects, and any warnings that you need to know about.  The pharmacist who fills the prescription can provide more information and answer questions you may have about the medicine.  If you have questions or concerns that the pharmacist cannot address, please call or return to the Emergency Department.     Remember that you can always come back to the Emergency Department if you are not able to see your regular provider in the amount of time listed above, if you get any new symptoms, or if there is anything that worries you.      Your next 10 appointments already scheduled     Sep 14, 2018  8:30 AM CDT   PHYSICAL with Nyasia Smith MD   [facility] ([facility])    [address] [address] [phone]              24 Hour Appointment Hotline       To make an appointment at any [facility] (formerly [facility]), call [phone] ([phone]). If you don't have a  family doctor or clinic, we will help you find one. [facility] are conveniently located to serve the needs of you and your family.             Review of your medicines      Our records show that you are taking the medicines listed below. If these are incorrect, please call your family doctor or clinic.        Dose / Directions Last dose taken    FISH OIL PO        Refills:  0        prenatal multivitamin plus iron 27-0.8 MG Tabs per tablet   Dose:  1 tablet        Take 1 tablet by mouth daily   Refills:  0        ranitidine 150 MG tablet   Commonly known as:  ZANTAC   Dose:  150 mg   Quantity:  28 tablet        Take 1 tablet (150 mg) by mouth 2 times daily for 14 days   Refills:  0        VITAMIN D (CHOLECALCIFEROL) PO   Dose:  400 Units        Take 400 Units by mouth daily   Refills:  0                Procedures and tests performed during your visit     D dimer quantitative    EKG 12-lead, tracing only    TSH with free T4 reflex    Troponin I (now)      Orders Needing Specimen Collection     None      Pending Results     No orders found from 9/10/2018 to 9/13/2018.            Pending Culture Results     No orders found from 9/10/2018 to 9/13/2018.            Pending Results Instructions     If you had any lab results that were not finalized at the time of your Discharge, you can call the ED Lab Result RN at [phone]. You will be contacted by this team for any positive Lab results or changes in treatment. The nurses are available 7 days a week from 10A to 6:30P.  You can leave a message 24 hours per day and they will return your call.        Test Results From Your Hospital Stay        9/12/2018 11:16 PM      Component Results     Component Value Ref Range & Units Status    TSH 1.53 0.40 - 4.00 mU/L Final         9/12/2018 11:13 PM      Component Results     Component Value Ref Range & Units Status    Troponin I ES <0.015 0.000 - 0.045 ug/L Final    The 99th percentile for upper reference range is 0.045  ug/L.  Troponin values   in the range of 0.045 - 0.120 ug/L may be associated with risks of adverse   clinical events.           9/12/2018 11:03 PM      Component Results     Component Value Ref Range & Units Status    D Dimer 0.3 0.0 - 0.50 ug/ml FEU Final    This D-dimer assay is intended for use in conjunction with a clinical pretest   probability assessment model to exclude pulmonary embolism (PE) and deep   venous thrombosis (DVT) in outpatients suspected of PE or DVT. The cut-off   value is 0.5 ug/mL FEU.                  Clinical Quality Measure: Blood Pressure Screening     Your blood pressure was checked while you were in the emergency department today. The last reading we obtained was  BP: 117/83 . Please read the guidelines below about what these numbers mean and what you should do about them.  If your systolic blood pressure (the top number) is less than 120 and your diastolic blood pressure (the bottom number) is less than 80, then your blood pressure is normal. There is nothing more that you need to do about it.  If your systolic blood pressure (the top number) is 120-139 or your diastolic blood pressure (the bottom number) is 80-89, your blood pressure may be higher than it should be. You should have your blood pressure rechecked within a year by a primary care provider.  If your systolic blood pressure (the top number) is 140 or greater or your diastolic blood pressure (the bottom number) is 90 or greater, you may have high blood pressure. High blood pressure is treatable, but if left untreated over time it can put you at risk for heart attack, stroke, or kidney failure. You should have your blood pressure rechecked by a primary care provider within the next 4 weeks.  If your provider in the emergency department today gave you specific instructions to follow-up with your doctor or provider even sooner than that, you should follow that instruction and not wait for up to 4 weeks for your follow-up  visit.        Thank you for choosing [facility]       Thank you for choosing [facility] for your care. Our goal is always to provide you with excellent care. Hearing back from our patients is one way we can continue to improve our services. Please take a few minutes to complete the written survey that you may receive in the mail after you visit with us. Thank you!        [employer] Information     [facility] gives you secure access to your electronic health record. If you see a primary care provider, you can also send messages to your care team and make appointments. If you have questions, please call your primary care clinic.  If you do not have a primary care provider, please call [phone] and they will assist you.        Care EveryWhere ID     This is your Care EveryWhere ID. This could be used by other organizations to access your [facility] medical records  [id number]        Equal Access to Services     PRASANTH AJ : Avis Conley, champ black, charleen mortensen, pricilla pérez. So [facility] [phone].    ATENCIÓN: Si habla español, tiene a jimenez disposición servicios gratuitos de asistencia lingüística. Llame al [phone].    We comply with applicable federal civil rights laws and [place] laws. We do not discriminate on the basis of race, color, national origin, age, disability, sex, sexual orientation, or gender identity.            After Visit Summary       This is your record. Keep this with you and show to your community pharmacist(s) and doctor(s) at your next visit.

## 2018-09-13 LAB — INTERPRETATION ECG - MUSE: NORMAL

## 2018-09-13 NOTE — TELEPHONE ENCOUNTER
"Pt states her heart has been racing, hands are cold and clammy, and her BP prior to calling in was 136/100, but did not know what her heart rate had been running, just said it is very fast, pt sounds very worried and anxious about her BP and palpitations. She has an upcoming PCP appt and had a recent ED visit for palpitations and chest pain on 9/10/18. Pt rechecked her BP and it initially gave her a reading of 217/169 to which she said \"that's not right\" and retook a minute later and the BP machine 151/99 and . Per ED note from 9/10/18:    Medical Decision Making:  Nicolasa Moore is a 33 year old female who presents for evaluation of palpitations and chest pain. Vitals normal on arrival. Initial ECG shows no acute ST changes to suggest myocardial infarction.  Troponin approximately 5 hours after onset of chest pain negative, doubt acute coronary syndrome in this low risk patient.  Elevated WBC of 11.1, no signs or symptoms of infection. The EKG reviewed and showed no evidence of SVT, A fib, Brugada syndrome, hypertrophic cardiomyopathy, middleton-parkinson white, pericarditis or prolonged QTc syndrome. The chest xray reviewed and shows no evidence of pneumothorax, infiltrate to suggest pneumonia, or widened mediastinum to suggest aortic dissection.  Patient is PERC negative with no hypoxia; doubt pulmonary embolism.       Patient received a GI cocktail and had significant relief of her symptoms.  She has a history of heartburn and did note that she was experiencing heartburn and was wondering if this could be contributing to discomfort.  She also has 2 young children at home and has been lifting them frequently, I discussed possibility of muscle soreness given the reproducible chest wall pain and recommended taking ibuprofen with food if symptoms persist.       She was prescribed Zantac 150 mg twice daily for 2 weeks.  She has a follow-up appointment with her primary care provider on Friday and plans to " discuss possible Holter monitor. Patient was grateful for the workup and all questions/concerns addressed prior to discharge home.  Return precautions discussed including worsening chest pain, shortness of breath, fever/chills, or any other new/concerning symptoms.       Protocol and care advice reviewed.  Pt is going to go back into the ED since Urgent cares won't be open past 9 pm to be seen due to her worries about her heart palpitations and BP  Caller states understanding of the recommended disposition.   Advised to call back if further questions or concerns.      Reason for Disposition    Patient sounds very sick or weak to the triager    Additional Information    Negative: Passed out (i.e., lost consciousness, collapsed and was not responding)    Negative: Shock suspected (e.g., cold/pale/clammy skin, too weak to stand, low BP, rapid pulse)    Negative: Difficult to awaken or acting confused  (e.g., disoriented, slurred speech)    Negative: Visible sweat on face or sweat dripping down face    Negative: Unable to walk, or can only walk with assistance (e.g., requires support)    Negative: [1] Received SHOCK from implantable cardiac defibrillator AND [2] persisting symptoms (i.e., palpitations, lightheadedness)    Negative: Sounds like a life-threatening emergency to the triager    Negative: Difficulty breathing    Negative: Dizziness, lightheadedness, or weakness    Negative: [1] Heart beating very rapidly (e.g., > 140 / minute) AND [2] present now  (Exception: during exercise)    Negative: Heart beating very slowly (e.g., < 50 / minute)  (Exception: athlete)    Negative: New or worsened shortness of breath with activity (dyspnea on exertion)    Protocols used: HEART RATE AND HEARTBEAT QUESTIONS-ADULT-
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
## 2018-09-13 NOTE — DISCHARGE INSTRUCTIONS
Follow up with your doctor on Friday  Mention the possibility of obtaining holter monitor or other event monitor    Discharge Instructions  Palpitations    Palpitations are an unusual awareness of your heartbeat. People often describe this as the heart skipping, fluttering, racing, irregular, or pounding. At this time, your provider has found no signs that your palpitations are due to a serious or life-threatening condition. However, sometimes there is a serious problem that does not show up right away.    Palpitations can be caused by caffeine, cigarettes, diet pills, energy drinks or supplements, other stimulants, and medications and street drugs. They can also be caused by anxiety, hormone conditions such as high thyroid, and other medical conditions. Sometimes they are a sign of abnormal rhythm in the heart. At this time, your provider did not find any dangerous cause of your symptoms.    Generally, every Emergency Department visit should have a follow-up clinic visit with either a primary or a specialty clinic/provider. Please follow-up as instructed by your emergency provider today.    Return to the Emergency Department if:    You get chest pain or tightness.    You are short of breath.    You get very weak or tired.    You pass out or faint.    Your heart rate is over 120 beats per minute for more than 10 minutes while you are resting.     You have anything else that worries you.    What can I do to help myself?    Fill any prescriptions the provider gave you and take them right away.     Follow your provider s instructions about the prescription medicines you are on. Sometimes the provider may tell you to stop taking a medicine or change the dose.    If you smoke, this may be a good time to quit! The less you can smoke, the better.    Do not use energy drinks, diet pills, or stimulants. Limit your use of caffeine.  If you were given a prescription for medicine here today, be sure to read all of the  information (including the package insert) that comes with your prescription.  This will include important information about the medicine, its side effects, and any warnings that you need to know about.  The pharmacist who fills the prescription can provide more information and answer questions you may have about the medicine.  If you have questions or concerns that the pharmacist cannot address, please call or return to the Emergency Department.     Remember that you can always come back to the Emergency Department if you are not able to see your regular provider in the amount of time listed above, if you get any new symptoms, or if there is anything that worries you.

## 2018-09-14 ENCOUNTER — OFFICE VISIT (OUTPATIENT)
Dept: INTERNAL MEDICINE | Facility: CLINIC | Age: 33
End: 2018-09-14
Payer: COMMERCIAL

## 2018-09-14 VITALS
DIASTOLIC BLOOD PRESSURE: 92 MMHG | RESPIRATION RATE: 18 BRPM | WEIGHT: 214.4 LBS | HEART RATE: 100 BPM | TEMPERATURE: 98.4 F | OXYGEN SATURATION: 97 % | HEIGHT: 67 IN | SYSTOLIC BLOOD PRESSURE: 130 MMHG | BODY MASS INDEX: 33.65 KG/M2

## 2018-09-14 DIAGNOSIS — E66.9 OBESITY (BMI 30-39.9): ICD-10-CM

## 2018-09-14 DIAGNOSIS — R00.2 PALPITATIONS: Primary | ICD-10-CM

## 2018-09-14 DIAGNOSIS — R03.0 ELEVATED BLOOD PRESSURE READING WITHOUT DIAGNOSIS OF HYPERTENSION: ICD-10-CM

## 2018-09-14 DIAGNOSIS — Z76.89 ENCOUNTER TO ESTABLISH CARE: ICD-10-CM

## 2018-09-14 PROBLEM — O34.219 PREVIOUS CESAREAN SECTION COMPLICATING PREGNANCY, ANTEPARTUM CONDITION OR COMPLICATION: Status: RESOLVED | Noted: 2017-10-17 | Resolved: 2018-09-14

## 2018-09-14 PROBLEM — D62 ANEMIA DUE TO BLOOD LOSS, ACUTE: Status: RESOLVED | Noted: 2017-10-21 | Resolved: 2018-09-14

## 2018-09-14 PROBLEM — Z30.09 GENERAL COUNSELING FOR PRESCRIPTION OF ORAL CONTRACEPTIVES: Status: RESOLVED | Noted: 2017-02-13 | Resolved: 2018-09-14

## 2018-09-14 PROBLEM — R07.9 ACUTE CHEST PAIN: Status: RESOLVED | Noted: 2017-02-13 | Resolved: 2018-09-14

## 2018-09-14 PROBLEM — Z98.891 STATUS POST REPEAT LOW TRANSVERSE CESAREAN SECTION: Status: RESOLVED | Noted: 2017-10-20 | Resolved: 2018-09-14

## 2018-09-14 PROCEDURE — 99214 OFFICE O/P EST MOD 30 MIN: CPT | Performed by: INTERNAL MEDICINE

## 2018-09-14 NOTE — PATIENT INSTRUCTIONS
Holter monitor set-up today.    ---    If results are unremarkable, will likely recommend trial of beta blocker (atenolol) with follow-up in 2 weeks after starting medication.

## 2018-09-14 NOTE — PROGRESS NOTES
SUBJECTIVE:                                                      HPI: Nicolasa Moore is a pleasant 33 year old female who presents for for ER follow-up and to establish care:    Patient has been to the ER twice in the last week or palpitations. She has been to the ER a total of 4 times for palpitations in the last year. Her workup to date, including blood work, EKGs, echocardiogram, CT chest have all been unrevealing.    Next best step in patient's workup is a Holter monitor.    Patient's blood pressure is also noted to be elevated today. Review of EPIC demonstrates several elevated blood pressures in the last year. No prior history of or treatment for high blood pressure. Other than palpitations, patient is asymptomatic from a blood pressure perspective: no chest pain, shortness of breath, lightheadedness, dizziness, headaches, or vision changes.    PMH, PSH, FH, SH, medications, allergies, immunizations, and preventative health measures reviewed.     Past Medical History:   Diagnosis Date     Obesity (BMI 30-39.9)      Past Surgical History:   Procedure Laterality Date      SECTION N/A 2015    Procedure:  SECTION;  Surgeon: Ian Sanchez MD;  Location: [facility]      SECTION N/A 10/20/2017    Procedure:  SECTION;  REPEAT  SECTION;  Surgeon: Phan Bill MD;  Location: [facility]     Family History   Problem Relation Age of Onset     Hypertension Maternal Grandmother      Hypertension Paternal Grandfather      Diabetes No family hx of      Myocardial Infarction No family hx of      Cerebrovascular Disease No family hx of      Coronary Artery Disease Early Onset No family hx of      Breast Cancer No family hx of      Ovarian Cancer No family hx of      Colon Cancer No family hx of      Occupational History     Senior Paralegal      Social History Main Topics     Smoking status: Never Smoker     Smokeless tobacco: Never Used     Alcohol use No     Drug use: No  "    Sexual activity: Yes     Partners: Male     Social History Narrative    .    3 kids (1, 3, and 14 as of 2018). 14 year old is her step-son.    Walking and yoga regularly.      Allergies   Allergen Reactions     Penicillins Itching     Current Outpatient Prescriptions   Medication Sig     Omega-3 Fatty Acids (FISH OIL PO)      Prenatal Vit-Fe Fumarate-FA (PRENATAL MULTIVITAMIN PLUS IRON) 27-0.8 MG TABS per tablet Take 1 tablet by mouth daily     ranitidine (ZANTAC) 150 MG tablet Take 1 tablet (150 mg) by mouth 2 times daily for 14 days     VITAMIN D, CHOLECALCIFEROL, PO Take 400 Units by mouth daily     Immunization History   Administered Date(s) Administered     Influenza Vaccine IM 3yrs+ 4 Valent IIV4 10/23/2017     TDAP Vaccine (Adacel) 04/27/2015     OBJECTIVE:                                                      BP (!) 130/92  Pulse 100  Temp 98.4  F (36.9  C) (Oral)  Resp 18  Ht 5' 7\" (1.702 m)  Wt 214 lb 6.4 oz (97.3 kg)  SpO2 97%  BMI 33.58 kg/m2  Constitutional: well-appearing    PREVENTATIVE HEALTH                                                      Breast CA screening: not medically indicated at this time   Cervical CA screening: last pap performed 2017 ([facility]), normal per patient   Colon CA screening: not medically indicated at this time   Lung CA screening: n/a   Dexa: not medically indicated at this time   Screening HCV: n/a   Screening HIV: completed  Screening cholesterol: not medically indicated at this time   Screening diabetes: not medically indicated at this time   STD testing: no risk factors present  Depression screening: PHQ-2 assessment completed and reviewed - no intervention indicated at this time  Alcohol misuse screening: alcohol use reviewed - no intervention indicated at this time  Immunizations: reviewed; flu shot DUE - will get later this fall    ASSESSMENT/PLAN:                                                       (R00.2) Palpitations  (primary encounter " diagnosis)  Comment: etiology unclear, but suspect PVCs and/or anxiety.  Plan:    - 48 hour Holter monitor set up today.   - if Holter monitor unrevealing, recommend trial of beta-blocker with follow-up after 2 weeks.   - patient may also benefit from an anxiolytic (SSRI as well).     (R03.0) Elevated blood pressure reading without diagnosis of hypertension  Comment: may be due to anxiety.  Plan:    - pending above.   - if Holter monitor unrevealing, recommend trial of beta-blocker with follow-up after 2 weeks.    (Z76.89) Encounter to establish care  Comment: PMH, PSH, FH, SH, medications, allergies, immunizations, and preventative health measures reviewed.     The instructions on the AVS were discussed and explained to the patient. Patient expressed understanding of instructions.    (Chart documentation was completed, in part, with [employer] voice-recognition software. Even though reviewed, some grammatical, spelling, and word errors may remain.)    Nyasia Smith MD   [address] [address]  T: [phone], F: [phone]

## 2018-09-14 NOTE — MR AVS SNAPSHOT
After Visit Summary   9/14/2018    Nicolasa Moore    MRN: [id number]           Patient Information     Date Of Birth          1985        Visit Information        Provider Department      9/14/2018 8:30 AM Nyasia Smith MD [facility]        Today's Diagnoses     Palpitations    -  1      Care Instructions    Holter monitor set-up today.    ---    If results are unremarkable, will likely recommend trial of beta blocker (atenolol) with follow-up in 2 weeks after starting medication.               Follow-ups after your visit        Future tests that were ordered for you today     Open Future Orders        Priority Expected Expires Ordered    Holter Monitor 48 hour - Adult Routine  10/29/2018 9/14/2018            Who to contact     If you have questions or need follow up information about today's clinic visit or your schedule please contact [facility] directly at [phone].  Normal or non-critical lab and imaging results will be communicated to you by MyChart, letter or phone within 4 business days after the clinic has received the results. If you do not hear from us within 7 days, please contact the clinic through [employer] or phone. If you have a critical or abnormal lab result, we will notify you by phone as soon as possible.  Submit refill requests through [employer] or call your pharmacy and they will forward the refill request to us. Please allow 3 business days for your refill to be completed.          Additional Information About Your Visit        MyChart Information     [employer] gives you secure access to your electronic health record. If you see a primary care provider, you can also send messages to your care team and make appointments. If you have questions, please call your primary care clinic.  If you do not have a primary care provider, please call [phone] and they will assist you.        Care EveryWhere ID     This is your  "Care EveryWhere ID. This could be used by other organizations to access your [place] medical records  [id number]        Your Vitals Were     Pulse Temperature Respirations Height Pulse Oximetry BMI (Body Mass Index)    100 98.4  F (36.9  C) (Oral) 18 5' 7\" (1.702 m) 97% 33.58 kg/m2       Blood Pressure from Last 3 Encounters:   09/14/18 (!) 130/92   09/12/18 117/83   09/10/18 115/73    Weight from Last 3 Encounters:   09/14/18 214 lb 6.4 oz (97.3 kg)   09/12/18 218 lb 6.4 oz (99.1 kg)   09/10/18 216 lb (98 kg)               Primary Care Provider Office Phone # Fax #    Nyasia Smith [phone]       [address] [facility] [address]        Equal Access to Services     PRASANTH AJ : Hadii aad ku hadasho Soomaali, waaxda luqadaha, qaybta kaalmada adeegyada, waxay haliin hayericn vijaya oliver . So [facility] [phone].    ATENCIÓN: Si habla español, tiene a jimenez disposición servicios gratuitos de asistencia lingüística. Tiff al [phone].    We comply with applicable federal civil rights laws and [place] laws. We do not discriminate on the basis of race, color, national origin, age, disability, sex, sexual orientation, or gender identity.            Thank you!     Thank you for choosing [facility]  for your care. Our goal is always to provide you with excellent care. Hearing back from our patients is one way we can continue to improve our services. Please take a few minutes to complete the written survey that you may receive in the mail after your visit with us. Thank you!             Your Updated Medication List - Protect others around you: Learn how to safely use, store and throw away your medicines at www.disposemymeds.org.          This list is accurate as of 9/14/18  9:04 AM.  Always use your most recent med list.                   Brand Name Dispense Instructions for use Diagnosis    FISH OIL PO           prenatal multivitamin plus iron 27-0.8 MG Tabs " per tablet      Take 1 tablet by mouth daily        ranitidine 150 MG tablet    ZANTAC    28 tablet    Take 1 tablet (150 mg) by mouth 2 times daily for 14 days        VITAMIN D (CHOLECALCIFEROL) PO      Take 400 Units by mouth daily

## 2018-09-17 ENCOUNTER — TELEPHONE (OUTPATIENT)
Dept: INTERNAL MEDICINE | Facility: CLINIC | Age: 33
End: 2018-09-17

## 2018-09-17 DIAGNOSIS — I10 BENIGN ESSENTIAL HYPERTENSION: Primary | ICD-10-CM

## 2018-09-17 DIAGNOSIS — R00.2 PALPITATIONS: ICD-10-CM

## 2018-09-17 RX ORDER — ATENOLOL 50 MG/1
50 TABLET ORAL DAILY
Qty: 90 TABLET | Refills: 3 | Status: SHIPPED | OUTPATIENT
Start: 2018-09-17 | End: 2018-09-18

## 2018-09-17 NOTE — TELEPHONE ENCOUNTER
Prescribed atenolol 50mg daily. She can start today.    I will touch base with her once her heart monitor results are processed.    Regardless, we should meet again ~2 weeks for follow-up.

## 2018-09-17 NOTE — TELEPHONE ENCOUNTER
Patient calling that she is just dropping off holter monitor. Is wondering if she can start on med now or need to wait for results? Feels BP is high but does not want to take it. Gets dizzy and feels palpitations. Has ongoing paliptiations and body chills. Says PCP talked about possible anxiety at last appt. Denies any worsening or new symptoms since last appt. No chest pain or breathing difficulties. Gets chills and tingling both arms. Pressure headache left side of face. Says all this was discussed at last appt. Please advise.

## 2018-09-18 ENCOUNTER — TELEPHONE (OUTPATIENT)
Dept: INTERNAL MEDICINE | Facility: CLINIC | Age: 33
End: 2018-09-18

## 2018-09-18 DIAGNOSIS — R00.2 PALPITATIONS: ICD-10-CM

## 2018-09-18 DIAGNOSIS — I10 BENIGN ESSENTIAL HYPERTENSION: ICD-10-CM

## 2018-09-18 RX ORDER — ATENOLOL 50 MG/1
50 TABLET ORAL DAILY
Qty: 90 TABLET | Refills: 3 | Status: SHIPPED | OUTPATIENT
Start: 2018-09-18 | End: 2018-09-19

## 2018-09-18 NOTE — TELEPHONE ENCOUNTER
Reason for Call:  Other call back    Detailed comments: Pt saw Dr Smith today, 9/18/18.  Dr Smith prescribed atenolol.  The pharmacy that the pt listed isn't covered by her insurance.  Pt wants the script sent to [facility] [address] in [place].    Phone Number Patient can be reached at: Home number on file [phone] (home)    Best Time: anytime    Can we leave a detailed message on this number? YES    Call taken on 9/18/2018 at 12:37 PM by ALIX MATTSON

## 2018-09-19 ENCOUNTER — TELEPHONE (OUTPATIENT)
Dept: INTERNAL MEDICINE | Facility: CLINIC | Age: 33
End: 2018-09-19

## 2018-09-19 DIAGNOSIS — I10 BENIGN ESSENTIAL HYPERTENSION: ICD-10-CM

## 2018-09-19 DIAGNOSIS — R00.0 TACHYCARDIA: Primary | ICD-10-CM

## 2018-09-19 DIAGNOSIS — F41.1 GAD (GENERALIZED ANXIETY DISORDER): ICD-10-CM

## 2018-09-19 RX ORDER — LABETALOL 200 MG/1
200 TABLET, FILM COATED ORAL 2 TIMES DAILY
Qty: 180 TABLET | Refills: 3 | Status: SHIPPED | OUTPATIENT
Start: 2018-09-19

## 2018-09-19 NOTE — TELEPHONE ENCOUNTER
Reason for Call:  Other call back    Detailed comments: Pt saw Dr Smith 9/14/18.  Dr Smith prescribed atenolol.  The pharmacist recommended that the pt take an alternative drug, because she is still breast feeding.  Pt uses [facility] on [address]    Phone Number Patient can be reached at: Home number on file [phone] (home)    Best Time: anytime    Can we leave a detailed message on this number? YES    Call taken on 9/19/2018 at 7:54 AM by ALIX MATTSON

## 2018-09-20 ENCOUNTER — TELEPHONE (OUTPATIENT)
Dept: INTERNAL MEDICINE | Facility: CLINIC | Age: 33
End: 2018-09-20

## 2018-09-20 LAB — INTERPRETATION MONITOR -MUSE: NORMAL

## 2018-09-20 NOTE — TELEPHONE ENCOUNTER
Called and spoke with patient. Patient stated when she went to  new Rx pharmacist continued to advise that this medication was not indicated while breastfeeding. Pharmacist suggested propanolol as a safe medication to continue nursing while taking medication. Patient stated that she is not ready to try and stop nursing and feels mom guilt to have to stop as her baby also does not seem ready.   Aniya Paz, CMA on 9/20/2018 at 1:47 PM

## 2018-09-20 NOTE — TELEPHONE ENCOUNTER
Reason for Call:  Other prescription    Detailed comments: The rx that was prescribed yesterday Amali has a question in regards to it. She spoke with the pharmacist and she wanted to clarify if this medication would interfere with her breast milk or not.     Phone Number Patient can be reached at: Home number on file [phone] (home)    Best Time: asap    Can we leave a detailed message on this number? YES    Call taken on 9/20/2018 at 8:00 AM by Veronica Loes

## 2018-09-20 NOTE — TELEPHONE ENCOUNTER
Spoke to patient.    Relayed Holter results (negative/normal).    Will cancel labetalol Rx.     Recommend trial of Zoloft 50mg daily.    Follow-up in 6 weeks (earlier as needed).

## 2018-12-14 ENCOUNTER — ALLIED HEALTH/NURSE VISIT (OUTPATIENT)
Dept: NURSING | Facility: CLINIC | Age: 33
End: 2018-12-14
Payer: COMMERCIAL

## 2018-12-14 DIAGNOSIS — Z23 NEED FOR PROPHYLACTIC VACCINATION AND INOCULATION AGAINST INFLUENZA: Primary | ICD-10-CM

## 2018-12-14 PROCEDURE — 90471 IMMUNIZATION ADMIN: CPT

## 2018-12-14 PROCEDURE — 90686 IIV4 VACC NO PRSV 0.5 ML IM: CPT

## 2018-12-14 NOTE — PROGRESS NOTES

## 2019-02-24 ENCOUNTER — ANCILLARY PROCEDURE (OUTPATIENT)
Dept: GENERAL RADIOLOGY | Facility: CLINIC | Age: 34
End: 2019-02-24
Attending: FAMILY MEDICINE
Payer: COMMERCIAL

## 2019-02-24 ENCOUNTER — OFFICE VISIT (OUTPATIENT)
Dept: URGENT CARE | Facility: URGENT CARE | Age: 34
End: 2019-02-24
Payer: COMMERCIAL

## 2019-02-24 VITALS
HEART RATE: 96 BPM | TEMPERATURE: 99 F | RESPIRATION RATE: 16 BRPM | OXYGEN SATURATION: 97 % | DIASTOLIC BLOOD PRESSURE: 90 MMHG | SYSTOLIC BLOOD PRESSURE: 118 MMHG

## 2019-02-24 DIAGNOSIS — S99.929A INJURY OF FOOT, UNSPECIFIED LATERALITY, INITIAL ENCOUNTER: Primary | ICD-10-CM

## 2019-02-24 PROCEDURE — 99213 OFFICE O/P EST LOW 20 MIN: CPT | Performed by: FAMILY MEDICINE

## 2019-02-24 PROCEDURE — 73630 X-RAY EXAM OF FOOT: CPT | Mod: LT

## 2019-02-24 NOTE — PROGRESS NOTES
SUBJECTIVE:  Chief Complaint   Patient presents with     Urgent Care     Pt fell injured right foot and back   .ident presents with a chief complaint of bilateral foot.  The injury occurred 2 weeks ago.   The injury happened while while walking.   How: immediate pain  The patient complained of moderate pain and has had decreased ROM.    Pain exacerbated by weight-bearing and movement    He treated it initially with no therapy.   This is the first time this type of injury has occurred to this patient.     Past Medical History:   Diagnosis Date     Obesity (BMI 30-39.9)      Allergies   Allergen Reactions     Penicillins Itching     Social History     Tobacco Use     Smoking status: Never Smoker     Smokeless tobacco: Never Used   Substance Use Topics     Alcohol use: No       ROSINTEGUMENTARY/SKIN: NEGATIVE for open wound/bleeding and NEGATIVE for bruising    EXAM: /90   Pulse 96   Temp 99  F (37.2  C) (Tympanic)   Resp 16   SpO2 97%    Gen: healthy,alert,no distress  Extremity: toe has pain with palpation and rom.   There is not compromise to the distal circulation.  Pulses are +2 and CRT is brisk.  EXTREMITIES: peripheral pulses normal  SKIN: no suspicious lesions or rashes  NEURO: Normal strength and tone, sensory exam grossly normal, mentation intact and speech normal    Xray without acute findings, read by Nico Hairston D.O.      ICD-10-CM    1. Injury of foot, unspecified laterality, initial encounter S99.929A XR Foot Bilateral G/E 3 Views     F/U Podiatry if conts  RICE

## 2019-10-30 ENCOUNTER — HOSPITAL ENCOUNTER (EMERGENCY)
Facility: CLINIC | Age: 34
Discharge: HOME | End: 2019-10-30
Attending: EMERGENCY MEDICINE | Admitting: EMERGENCY MEDICINE
Payer: COMMERCIAL

## 2019-10-30 VITALS
RESPIRATION RATE: 18 BRPM | OXYGEN SATURATION: 99 % | BODY MASS INDEX: 30.61 KG/M2 | HEART RATE: 93 BPM | TEMPERATURE: 99.3 F | WEIGHT: 195 LBS | DIASTOLIC BLOOD PRESSURE: 68 MMHG | HEIGHT: 67 IN | SYSTOLIC BLOOD PRESSURE: 120 MMHG

## 2019-10-30 DIAGNOSIS — R00.2 PALPITATIONS: ICD-10-CM

## 2019-10-30 DIAGNOSIS — F41.1 ANXIETY REACTION: ICD-10-CM

## 2019-10-30 LAB — INTERPRETATION ECG - MUSE: NORMAL

## 2019-10-30 PROCEDURE — 99283 EMERGENCY DEPT VISIT LOW MDM: CPT

## 2019-10-30 PROCEDURE — 25000132 ZZH RX MED GY IP 250 OP 250 PS 637: Performed by: EMERGENCY MEDICINE

## 2019-10-30 PROCEDURE — 93005 ELECTROCARDIOGRAM TRACING: CPT

## 2019-10-30 RX ORDER — LORAZEPAM 0.5 MG/1
.5-1 TABLET ORAL EVERY 8 HOURS PRN
Qty: 10 TABLET | Refills: 0 | Status: SHIPPED | OUTPATIENT
Start: 2019-10-30

## 2019-10-30 RX ORDER — LORAZEPAM 0.5 MG/1
0.5 TABLET ORAL ONCE
Status: COMPLETED | OUTPATIENT
Start: 2019-10-30 | End: 2019-10-30

## 2019-10-30 RX ADMIN — LORAZEPAM 0.5 MG: 0.5 TABLET ORAL at 13:49

## 2019-10-30 ASSESSMENT — MIFFLIN-ST. JEOR: SCORE: 1617.14

## 2019-10-30 ASSESSMENT — ENCOUNTER SYMPTOMS
SLEEP DISTURBANCE: 1
HEADACHES: 0
DIZZINESS: 1
MYALGIAS: 1
NERVOUS/ANXIOUS: 1
PALPITATIONS: 1

## 2019-10-30 NOTE — ED PROVIDER NOTES
History     Chief Complaint:  Chest Pain and Palpitations    HPI   Nicolasa Moore is a 34 year old female who presents with chest pain and palpitations. The patient states that she believes she had an anxiety attack. She states she started to feel dizzy, trembling, heart palpation, and disoriented. She states it feels like someone punched her on the left side of her face. The patient then did some breathing exercises which helped somewhat, but then she felt another wave come on. The patient reports she has felt anxious in the past, but today's episode was more extreme. The patient reports she has not had a regular sleep schedule recently and has been more stressed recently. The patient notes she still feels palpations, muscle aches in chest, neck, and back and still feels anxious but is feeling better. The patient denies swelling in her legs or headache.      Allergies:  Penicillins   Peanuts    Medications:    Vitamin D    Past Medical History:    Obesity   Sinus tachycardia    Past Surgical History:     section x2    Family History:    Maternal grandmother: hypertension  Paternal grandfather: hypertension     Social History:  The patient was accompanied to the ED by brother.  Smoking Status: Never Smoker  Smokeless Tobacco: Never Used  Alcohol Use: No  Drug Use: No  PCP: Nyasia Smith  Marital Status:        Review of Systems   Cardiovascular: Positive for chest pain and palpitations. Negative for leg swelling.   Musculoskeletal: Positive for myalgias.   Neurological: Positive for dizziness. Negative for headaches.        Trembling   Psychiatric/Behavioral: Positive for sleep disturbance. The patient is nervous/anxious.    All other systems reviewed and are negative.    Physical Exam     Patient Vitals for the past 24 hrs:   BP Temp Pulse Resp SpO2 Height Weight   10/30/19 1400 129/80 -- 94 -- 99 % -- --   10/30/19 1345 130/81 -- 101 -- 99 % -- --   10/30/19 1330 127/71 -- 96 -- 100 %  "-- --   10/30/19 1315 123/78 -- 94 -- 96 % -- --   10/30/19 1300 125/76 -- 98 -- 100 % -- --   10/30/19 1245 120/82 -- 90 -- 97 % -- --   10/30/19 1230 126/82 -- 93 -- 99 % -- --   10/30/19 1119 (!) 150/80 99.3  F (37.4  C) 107 18 100 % 1.702 m (5' 7\") 88.5 kg (195 lb)       Physical Exam  General: Alert and cooperative with exam. Patient in moderate emotional distress. Normal mentation.  Head:  Scalp is NC/AT  Eyes:  No scleral icterus, PERRL  ENT:  The external nose and ears are normal. The oropharynx is normal and without erythema; mucus membranes are moist. Uvula midline, no evidence of deep space infection.  Neck:  Normal range of motion without rigidity.  CV:  Regular rate and rhythm    No pathologic murmur   Resp:  Breath sounds are clear bilaterally    Non-labored, no retractions or accessory muscle use  GI:  Abdomen is soft, no distension, no tenderness. No peritoneal signs  MS:  No lower extremity edema.  Skin:  Warm and dry, No rash or lesions noted.  Neuro: Oriented x 3. No gross motor deficits.  Psych:  Endorses anxiety. Denies depression.     Emergency Department Course   ECG:  ECG taken at 1131, ECG read at 1300  Sinus tachycardia  Cannot rule out anterior infarct, age undetermined  Abnormal ECG  No significant change when compared to EKG dated 9/12/18.  Rate 103 bpm. OR interval 138 ms. QRS duration 78 ms. QT/QTc 330/432 ms. P-R-T axes 30 23 20.    Interventions:  1349 Lorazepam 0.5 mg Oral    Emergency Department Course:  Nursing notes and vitals reviewed.    1233 I performed an exam of the patient as documented above.     1336 I returned to check on the patient. She reports she is still feeling anxious.     1433 I returned to check on the patient. She states she is feeling improved.     Findings and plan explained to the Patient. Patient discharged home with instructions regarding supportive care, medications, and reasons to return. The importance of close follow-up was reviewed. The patient was " prescribed Lorazepam.     Impression & Plan    Medical Decision Making:  Nicolasa Moore is a 34 year old female who presents with sensation of palpitations, chest pain, shortness of breath and lightheadedness.  The work up in the Emergency Department is negative, monitoring and EKG have not shown any abnormal heart rhythms or concerning morphologies.  Presentation consistent with anxiety reaction. Resolved with ativan; rx provided for ativan for breakthrough sx. Recommended close PCP follow-up; return precautions discussed.    Diagnosis:    ICD-10-CM    1. Palpitations R00.2    2. Anxiety reaction F41.1        Disposition:   The patient is discharged to home.    Discharge Medications:  New Prescriptions    LORAZEPAM (ATIVAN) 0.5 MG TABLET    Take 1-2 tablets (0.5-1 mg) by mouth every 8 hours as needed for anxiety       Scribe Disclosure:  Cecy TUCKER, am serving as a scribe at 12:30 PM on 10/30/2019 to document services personally performed by Ever Ghotra DO based on my observations and the provider's statements to me.    EMERGENCY DEPARTMENT       Ever Ghotra DO  10/30/19 0317

## 2019-10-30 NOTE — LETTER
October 30, 2019      To Whom It May Concern:      Nicolasa Moore was seen in our Emergency Department today, 10/30/19.  I expect her condition to improve.    She may return to work/school tomorrow when improved.    Sincerely,        MARJORIE Ghotra MD

## 2019-10-30 NOTE — ED AVS SNAPSHOT
Emergency Department  [address] [facility] [address]  Phone:  [phone]  Fax:  [phone]                                    Nicolasa Moore   MRN: [id number]    Department:   Emergency Department   Date of Visit:  10/30/2019           After Visit Summary Signature Page    I have received my discharge instructions, and my questions have been answered. I have discussed any challenges I see with this plan with the nurse or doctor.    ..........................................................................................................................................  Patient/Patient Representative Signature      ..........................................................................................................................................  Patient Representative Print Name and Relationship to Patient    ..................................................               ................................................  Date                                   Time    ..........................................................................................................................................  Reviewed by Signature/Title    ...................................................              ..............................................  Date                                               Time          22EPIC Rev 08/18

## 2019-12-25 NOTE — PLAN OF CARE
Problem: Patient Care Overview  Goal: Plan of Care/Patient Progress Review  Outcome: No Change  PT VSS, taking oral pain medication with adequate pain control.  Breast feeding infant well. Will continue to monitor.       35203 Detailed [id number] Detailed [id number] Detailed

## 2020-03-02 ENCOUNTER — HEALTH MAINTENANCE LETTER (OUTPATIENT)
Age: 35
End: 2020-03-02

## 2020-06-10 ENCOUNTER — OFFICE VISIT (OUTPATIENT)
Dept: URGENT CARE | Facility: URGENT CARE | Age: 35
End: 2020-06-10
Payer: COMMERCIAL

## 2020-06-10 VITALS
SYSTOLIC BLOOD PRESSURE: 138 MMHG | HEART RATE: 109 BPM | HEIGHT: 67 IN | RESPIRATION RATE: 20 BRPM | DIASTOLIC BLOOD PRESSURE: 86 MMHG | WEIGHT: 219 LBS | BODY MASS INDEX: 34.37 KG/M2 | OXYGEN SATURATION: 100 % | TEMPERATURE: 97.9 F

## 2020-06-10 DIAGNOSIS — K52.9 GASTROENTERITIS: Primary | ICD-10-CM

## 2020-06-10 PROCEDURE — 99214 OFFICE O/P EST MOD 30 MIN: CPT | Performed by: NURSE PRACTITIONER

## 2020-06-10 RX ORDER — METHOCARBAMOL 500 MG/1
TABLET, FILM COATED ORAL
COMMUNITY
Start: 2020-06-09

## 2020-06-10 RX ORDER — ONDANSETRON 4 MG/1
8 TABLET, FILM COATED ORAL EVERY 8 HOURS PRN
Qty: 9 TABLET | Refills: 1 | Status: SHIPPED | OUTPATIENT
Start: 2020-06-10 | End: 2020-06-13

## 2020-06-10 RX ORDER — FLUTICASONE PROPIONATE 50 MCG
2 SPRAY, SUSPENSION (ML) NASAL
COMMUNITY
Start: 2020-06-09

## 2020-06-10 ASSESSMENT — MIFFLIN-ST. JEOR: SCORE: 1721.01

## 2020-06-10 NOTE — PROGRESS NOTES
SUBJECTIVE:   Nicolasa Moore is a 35 year old female presenting with a chief complaint of vomiting, abdominal cramping. Lip tingling, chills, nausea. Took Robaxin for the 1st time 11am yesterday and 1900 yesterday and then awoke with these symptoms today.  She denies any rash, shortness of breath, tongue swelling or lip swelling.    LMP May 24/2020.    Onset of symptoms was 1 day(s) ago.  Course of illness is sudden onset.    Severity moderate  Previous Treatment none      Past Medical History:   Diagnosis Date     Obesity (BMI 30-39.9)      Current Outpatient Medications   Medication Sig Dispense Refill     fluticasone (FLONASE) 50 MCG/ACT nasal spray 2 sprays       labetalol (NORMODYNE) 200 MG tablet Take 1 tablet (200 mg) by mouth 2 times daily 180 tablet 3     LORazepam (ATIVAN) 0.5 MG tablet Take 1-2 tablets (0.5-1 mg) by mouth every 8 hours as needed for anxiety 10 tablet 0     methocarbamol (ROBAXIN) 500 MG tablet        Omega-3 Fatty Acids (FISH OIL PO)        ondansetron (ZOFRAN) 4 MG tablet Take 2 tablets (8 mg) by mouth every 8 hours as needed for nausea 9 tablet 1     Prenatal Vit-Fe Fumarate-FA (PRENATAL MULTIVITAMIN PLUS IRON) 27-0.8 MG TABS per tablet Take 1 tablet by mouth daily       VITAMIN D, CHOLECALCIFEROL, PO Take 400 Units by mouth daily       Social History     Tobacco Use     Smoking status: Never Smoker     Smokeless tobacco: Never Used   Substance Use Topics     Alcohol use: No     Family History   Problem Relation Age of Onset     Hypertension Maternal Grandmother      Hypertension Paternal Grandfather      Diabetes No family hx of      Myocardial Infarction No family hx of      Cerebrovascular Disease No family hx of      Coronary Artery Disease Early Onset No family hx of      Breast Cancer No family hx of      Ovarian Cancer No family hx of      Colon Cancer No family hx of         ROS: 10 point ROS neg other than the symptoms noted above in the HPI.     OBJECTIVE  /86  "(BP Location: Right arm, Patient Position: Sitting, Cuff Size: Adult Regular)   Pulse 109   Temp 97.9  F (36.6  C) (Tympanic)   Resp 20   Ht 1.702 m (5' 7\")   Wt 99.3 kg (219 lb)   SpO2 100%   BMI 34.30 kg/m        GENERAL APPEARANCE: healthy appearing, alert     EYES: PERRL, EOMI, sclera non-icteric     HENT: oral exam benign, mucus membranes intact, without ulcers or lesions     NECK: no adenopathy or asymmetry, thyroid normal to palpation     RESP: lungs clear to auscultation - no rales, rhonchi or wheezes     CV: regular rates and rhythm, no murmurs, rubs, or gallop     ABDOMEN:  soft, nontender, no HSM or masses and bowel sounds normal     MS: extremities normal- no gross deformities noted; normal muscle tone.     SKIN: no suspicious lesions or rashes     NEURO: Normal strength and tone, mentation intact and speech normal     PSYCH: normal thought process; no significant mood disturbance    ASSESSMENT/PLAN:      ICD-10-CM    1. Gastroenteritis  K52.9 ondansetron (ZOFRAN) 4 MG tablet      Although patient felt this may be a allergic reaction to the Robaxin it is highly unlikely given that she is continuing to have symptoms and her last dose was at 1900 yesterday.  Half-life of Robaxin is much shorter.  Advised patient to treat symptoms like gastroenteritis and to hold Robaxin until symptoms are completely gone.  She may then trial again if she likes..    Follow Up:      Patient Instructions   Use Ondansetron for nausea and Loperamide for diarrhea.    Do not take Methocarbamol until symptoms are completely gone.      Patient Education     Noninfectious Gastroenteritis (Adult)    Gastroenteritis can cause nausea, vomiting, diarrhea, and cramping in the belly. This may occur from food sensitivity, inflammation of your gastrointestinal tract, medicines, stress, or other causes not related to infection. Your symptoms will usually last from 1 to 3 days, but can last longer. Antibiotics are not effective, but " simple home treatment will be helpful.  Home care  Medicine    You may use acetaminophen or NSAID medicines like ibuprofen or naproxen to control fever, unless another medicine is prescribed. (Note: If you have chronic liver or kidney disease, or ever had a stomach ulcer or gastrointestinaI bleeding, talk with your healthcare provider before using these medicines.) Aspirin should never be used in anyone under 18 years of age who is ill with a fever. It may cause severe liver damage. Don't increase your NSAID medicines if you are already taking these medicines for another condition (like arthritis). Don't use NSAIDS if you are on aspirin (such as for heart disease, or after a stroke).    If medicines for diarrhea or vomiting are prescribed, take only as directed.  General care and preventing spread of the illness    If symptoms are severe, rest at home for the next 24 hours or until you feel better.    Hand washing with soap and water is the best way to prevent the spread of infection. Wash your hands after touching anyone who is sick.    Wash your hands after using the toilet and before meals. Clean the toilet after each use.    Caffeine, tobacco, and alcohol can make your diarrhea, cramping, and pain worse.  Diet    Water and clear liquids are important so you do not get dehydrated. Drink a small amount at a time.    Don't force yourself to eat, especially if you have cramps, vomiting, or diarrhea. When you finally decide to start eating, do not eat large amounts at a time, even if you are hungry.    If you eat, avoid fatty, greasy, spicy, or fried foods.    Don't eat dairy products if you have diarrhea; they can make the diarrhea worse.  During the first 24 hours (the first full day), follow the diet below:    Beverages: Water, clear liquids, soft drinks without caffeine, like ginger ale; mineral water (plain or flavored); decaffeinated tea and coffee.    Soups: Clear broth, consommé, and bouillon sports drinks  aren't a good choice because they have too much sugar and not enough electrolytes. In this case, commercially available products called oral rehydration solutions are best.    Desserts: Plain gelatin, ice pops, and fruit juice bars  During the next 24 hours (the second day), you may add the following to the above if you have improved. If not, continue what you did the first day:    Hot cereal, plain toast, bread, rolls, crackers    Plain noodles, rice, mashed potatoes, chicken noodle or rice soup    Unsweetened canned fruit and bananas (don't eat pineapple or citrus)    Limit caffeine and chocolate. No spices or seasonings except salt.  During the next 24 hours    Gradually resume a normal diet, as you feel better and your symptoms improve.    If at any time your symptoms start getting worse, go back to clear liquids until you feel better.    Food preparation    If you have diarrhea, you should not prepare food for others. When you  prepare food for yourself, wash your hands before and after.    Wash your hands after using cutting boards, countertops, and knives that have been in contact with raw food.    Keep uncooked meats away from cooked and ready-to-eat foods.    Follow-up care  Follow up with your healthcare provider if you are not improving over the next 2 to 3 days, or as advised. If a stool (diarrhea) sample was taken, call for the results as directed.  Call 911  Call 911 if any of these occur:    Trouble breathing    Chest pain    Confusion    Severe drowsiness or trouble awakening    Seizure    Stiff neck  When to seek medical advice  Call your healthcare provider right away if any of these occur:     Increasing belly pain or constant lower right belly pain    Continued vomiting (unable to keep liquids down)    Frequent diarrhea (more than 5 times a day)    Blood in vomit or stool (black or red color)    Inability to tolerate solid food after a few days.    Dark urine, reduced urine output    Weakness,  dizziness    Drowsiness    Fever of 100.4 F (38.0 C) or higher, or as directed by your healthcare provider    New rash  Date Last Reviewed: 3/1/2018    6349-5180 [employer]. [address] [place] [address], [place] [address]. All rights reserved. This information is not intended as a substitute for professional medical care. Always follow your healthcare professional's instructions.               FAREED Hughes, CNP  [facility] Provider

## 2020-06-10 NOTE — PATIENT INSTRUCTIONS
Use Ondansetron for nausea and Loperamide for diarrhea.    Do not take Methocarbamol until symptoms are completely gone.      Patient Education     Noninfectious Gastroenteritis (Adult)    Gastroenteritis can cause nausea, vomiting, diarrhea, and cramping in the belly. This may occur from food sensitivity, inflammation of your gastrointestinal tract, medicines, stress, or other causes not related to infection. Your symptoms will usually last from 1 to 3 days, but can last longer. Antibiotics are not effective, but simple home treatment will be helpful.  Home care  Medicine    You may use acetaminophen or NSAID medicines like ibuprofen or naproxen to control fever, unless another medicine is prescribed. (Note: If you have chronic liver or kidney disease, or ever had a stomach ulcer or gastrointestinaI bleeding, talk with your healthcare provider before using these medicines.) Aspirin should never be used in anyone under 18 years of age who is ill with a fever. It may cause severe liver damage. Don't increase your NSAID medicines if you are already taking these medicines for another condition (like arthritis). Don't use NSAIDS if you are on aspirin (such as for heart disease, or after a stroke).    If medicines for diarrhea or vomiting are prescribed, take only as directed.  General care and preventing spread of the illness    If symptoms are severe, rest at home for the next 24 hours or until you feel better.    Hand washing with soap and water is the best way to prevent the spread of infection. Wash your hands after touching anyone who is sick.    Wash your hands after using the toilet and before meals. Clean the toilet after each use.    Caffeine, tobacco, and alcohol can make your diarrhea, cramping, and pain worse.  Diet    Water and clear liquids are important so you do not get dehydrated. Drink a small amount at a time.    Don't force yourself to eat, especially if you have cramps, vomiting, or diarrhea. When  you finally decide to start eating, do not eat large amounts at a time, even if you are hungry.    If you eat, avoid fatty, greasy, spicy, or fried foods.    Don't eat dairy products if you have diarrhea; they can make the diarrhea worse.  During the first 24 hours (the first full day), follow the diet below:    Beverages: Water, clear liquids, soft drinks without caffeine, like ginger ale; mineral water (plain or flavored); decaffeinated tea and coffee.    Soups: Clear broth, consommé, and bouillon sports drinks aren't a good choice because they have too much sugar and not enough electrolytes. In this case, commercially available products called oral rehydration solutions are best.    Desserts: Plain gelatin, ice pops, and fruit juice bars  During the next 24 hours (the second day), you may add the following to the above if you have improved. If not, continue what you did the first day:    Hot cereal, plain toast, bread, rolls, crackers    Plain noodles, rice, mashed potatoes, chicken noodle or rice soup    Unsweetened canned fruit and bananas (don't eat pineapple or citrus)    Limit caffeine and chocolate. No spices or seasonings except salt.  During the next 24 hours    Gradually resume a normal diet, as you feel better and your symptoms improve.    If at any time your symptoms start getting worse, go back to clear liquids until you feel better.    Food preparation    If you have diarrhea, you should not prepare food for others. When you  prepare food for yourself, wash your hands before and after.    Wash your hands after using cutting boards, countertops, and knives that have been in contact with raw food.    Keep uncooked meats away from cooked and ready-to-eat foods.    Follow-up care  Follow up with your healthcare provider if you are not improving over the next 2 to 3 days, or as advised. If a stool (diarrhea) sample was taken, call for the results as directed.  Call 911  Call 911 if any of these  occur:    Trouble breathing    Chest pain    Confusion    Severe drowsiness or trouble awakening    Seizure    Stiff neck  When to seek medical advice  Call your healthcare provider right away if any of these occur:     Increasing belly pain or constant lower right belly pain    Continued vomiting (unable to keep liquids down)    Frequent diarrhea (more than 5 times a day)    Blood in vomit or stool (black or red color)    Inability to tolerate solid food after a few days.    Dark urine, reduced urine output    Weakness, dizziness    Drowsiness    Fever of 100.4 F (38.0 C) or higher, or as directed by your healthcare provider    New rash  Date Last Reviewed: 3/1/2018    4978-2688 [employer]. [address] [place] [address], [place] [address]. All rights reserved. This information is not intended as a substitute for professional medical care. Always follow your healthcare professional's instructions.

## 2020-07-31 ENCOUNTER — HOSPITAL ENCOUNTER (EMERGENCY)
Facility: CLINIC | Age: 35
Discharge: HOME | End: 2020-07-31
Attending: PHYSICIAN ASSISTANT | Admitting: PHYSICIAN ASSISTANT
Payer: COMMERCIAL

## 2020-07-31 ENCOUNTER — APPOINTMENT (OUTPATIENT)
Dept: GENERAL RADIOLOGY | Facility: CLINIC | Age: 35
End: 2020-07-31
Attending: PHYSICIAN ASSISTANT
Payer: COMMERCIAL

## 2020-07-31 VITALS
TEMPERATURE: 97.3 F | OXYGEN SATURATION: 100 % | HEART RATE: 89 BPM | HEIGHT: 67 IN | WEIGHT: 210 LBS | BODY MASS INDEX: 32.96 KG/M2 | RESPIRATION RATE: 16 BRPM | DIASTOLIC BLOOD PRESSURE: 88 MMHG | SYSTOLIC BLOOD PRESSURE: 132 MMHG

## 2020-07-31 DIAGNOSIS — R42 DIZZINESS: ICD-10-CM

## 2020-07-31 DIAGNOSIS — M79.10 MYALGIA: ICD-10-CM

## 2020-07-31 DIAGNOSIS — R07.9 CHEST PAIN: ICD-10-CM

## 2020-07-31 LAB
ANION GAP SERPL CALCULATED.3IONS-SCNC: 1 MMOL/L (ref 3–14)
BASOPHILS # BLD AUTO: 0 10E9/L (ref 0–0.2)
BASOPHILS NFR BLD AUTO: 0.2 %
BUN SERPL-MCNC: 13 MG/DL (ref 7–30)
CALCIUM SERPL-MCNC: 9.4 MG/DL (ref 8.5–10.1)
CHLORIDE SERPL-SCNC: 107 MMOL/L (ref 94–109)
CO2 SERPL-SCNC: 29 MMOL/L (ref 20–32)
CREAT SERPL-MCNC: 0.63 MG/DL (ref 0.52–1.04)
D DIMER PPP FEU-MCNC: 0.3 UG/ML FEU (ref 0–0.5)
DIFFERENTIAL METHOD BLD: ABNORMAL
EOSINOPHIL # BLD AUTO: 0.1 10E9/L (ref 0–0.7)
EOSINOPHIL NFR BLD AUTO: 0.4 %
ERYTHROCYTE [DISTWIDTH] IN BLOOD BY AUTOMATED COUNT: 15.9 % (ref 10–15)
GFR SERPL CREATININE-BSD FRML MDRD: >90 ML/MIN/{1.73_M2}
GLUCOSE SERPL-MCNC: 96 MG/DL (ref 70–99)
HCT VFR BLD AUTO: 37 % (ref 35–47)
HGB BLD-MCNC: 12 G/DL (ref 11.7–15.7)
IMM GRANULOCYTES # BLD: 0 10E9/L (ref 0–0.4)
IMM GRANULOCYTES NFR BLD: 0.3 %
LYMPHOCYTES # BLD AUTO: 2.1 10E9/L (ref 0.8–5.3)
LYMPHOCYTES NFR BLD AUTO: 16 %
MCH RBC QN AUTO: 25.4 PG (ref 26.5–33)
MCHC RBC AUTO-ENTMCNC: 32.4 G/DL (ref 31.5–36.5)
MCV RBC AUTO: 78 FL (ref 78–100)
MONOCYTES # BLD AUTO: 0.5 10E9/L (ref 0–1.3)
MONOCYTES NFR BLD AUTO: 3.5 %
NEUTROPHILS # BLD AUTO: 10.6 10E9/L (ref 1.6–8.3)
NEUTROPHILS NFR BLD AUTO: 79.6 %
NRBC # BLD AUTO: 0 10*3/UL
NRBC BLD AUTO-RTO: 0 /100
PLATELET # BLD AUTO: 243 10E9/L (ref 150–450)
POTASSIUM SERPL-SCNC: 3.5 MMOL/L (ref 3.4–5.3)
RBC # BLD AUTO: 4.73 10E12/L (ref 3.8–5.2)
SODIUM SERPL-SCNC: 137 MMOL/L (ref 133–144)
TROPONIN I SERPL-MCNC: <0.015 UG/L (ref 0–0.04)
TROPONIN I SERPL-MCNC: <0.015 UG/L (ref 0–0.04)
TSH SERPL DL<=0.005 MIU/L-ACNC: 1.14 MU/L (ref 0.4–4)
WBC # BLD AUTO: 13.3 10E9/L (ref 4–11)

## 2020-07-31 PROCEDURE — 25000128 H RX IP 250 OP 636: Performed by: PHYSICIAN ASSISTANT

## 2020-07-31 PROCEDURE — 80048 BASIC METABOLIC PNL TOTAL CA: CPT | Performed by: PHYSICIAN ASSISTANT

## 2020-07-31 PROCEDURE — 84484 ASSAY OF TROPONIN QUANT: CPT | Mod: 91 | Performed by: PHYSICIAN ASSISTANT

## 2020-07-31 PROCEDURE — 85025 COMPLETE CBC W/AUTO DIFF WBC: CPT | Performed by: PHYSICIAN ASSISTANT

## 2020-07-31 PROCEDURE — 99285 EMERGENCY DEPT VISIT HI MDM: CPT | Mod: 25

## 2020-07-31 PROCEDURE — 96374 THER/PROPH/DIAG INJ IV PUSH: CPT

## 2020-07-31 PROCEDURE — 71046 X-RAY EXAM CHEST 2 VIEWS: CPT

## 2020-07-31 PROCEDURE — 85379 FIBRIN DEGRADATION QUANT: CPT | Performed by: PHYSICIAN ASSISTANT

## 2020-07-31 PROCEDURE — 25800030 ZZH RX IP 258 OP 636: Performed by: PHYSICIAN ASSISTANT

## 2020-07-31 PROCEDURE — 84443 ASSAY THYROID STIM HORMONE: CPT | Performed by: PHYSICIAN ASSISTANT

## 2020-07-31 PROCEDURE — 96361 HYDRATE IV INFUSION ADD-ON: CPT

## 2020-07-31 PROCEDURE — 93005 ELECTROCARDIOGRAM TRACING: CPT

## 2020-07-31 RX ORDER — LORAZEPAM 2 MG/ML
0.5 INJECTION INTRAMUSCULAR ONCE
Status: COMPLETED | OUTPATIENT
Start: 2020-07-31 | End: 2020-07-31

## 2020-07-31 RX ADMIN — SODIUM CHLORIDE 1000 ML: 9 INJECTION, SOLUTION INTRAVENOUS at 14:30

## 2020-07-31 RX ADMIN — LORAZEPAM 0.5 MG: 2 INJECTION INTRAMUSCULAR; INTRAVENOUS at 14:30

## 2020-07-31 ASSESSMENT — ENCOUNTER SYMPTOMS
SHORTNESS OF BREATH: 0
FEVER: 0
PALPITATIONS: 1
VOMITING: 0
LIGHT-HEADEDNESS: 1
ABDOMINAL PAIN: 0
DIZZINESS: 1
NUMBNESS: 0
WEAKNESS: 0
NAUSEA: 0
COUGH: 0

## 2020-07-31 ASSESSMENT — MIFFLIN-ST. JEOR: SCORE: 1680.18

## 2020-07-31 NOTE — ED TRIAGE NOTES
Developed left arm pain sudden onset at 1100 feeling heavy and and left back area. Pt states took an anti anxiety med and pain isnt as bad

## 2020-07-31 NOTE — ED PROVIDER NOTES
History   Chief Complaint:  Arm Pain, chest pain    HPI   Nicolasa Moore is a 35 year old female, who presents to the ED for evaluation of multiple symptoms.  She notes that around 11 AM while she was sitting watching TV she developed chest pressure in the center of her chest which radiates down both her left leg and her left arm.  This lasted for approximately 5 to 10 minutes before dissipating.  During this time she also felt dizzy and lightheaded and had a brief ringing in her ear.  She denies shortness of breath, nausea, vomiting, diaphoresis.  Symptoms do not appear to worsen with exertion.  She has felt some intermittent pressure since then.  She did take an Ativan at home which she has from a prior anxiety attack which did seem to help her symptoms somewhat.  She denies vision loss, and is able to ambulate without difficulty.  Denies numbness in her arms or legs.  No fever, cough, chills.  She does not smoke or use alcohol/drugs.  She takes no medications daily.  No prior history of DVT/PE, cancer, surgery or immobilization in the last month, or leg swelling.  No dysuria, rashes, or tick bites. She does note some left leg pain as well.    Allergies:  Penicillins    Medications:    Ativan PRN.    Past Medical History:    Obesity    Past Surgical History:     section x2    Family History:    History reviewed. No pertinent family history.     Social History:  Smoking status: Never  Alcohol use: No  Drug use: No  PCP: Nyasia Smith  Marital Status:   [2]    Review of Systems   Constitutional: Negative for fever.   HENT: Positive for tinnitus (  ).    Respiratory: Negative for cough and shortness of breath.    Cardiovascular: Positive for chest pain and palpitations.   Gastrointestinal: Negative for abdominal pain, nausea and vomiting.   Neurological: Positive for dizziness and light-headedness. Negative for syncope, weakness and numbness.   All other systems reviewed and are  "negative.    Physical Exam     Patient Vitals for the past 24 hrs:   BP Temp Temp src Pulse Resp SpO2 Height Weight   07/31/20 1216 (!) 142/90 97.3  F (36.3  C) Temporal 107 20 100 % 1.702 m (5' 7\") 95.3 kg (210 lb)       Physical Exam  General: Alert and cooperative with exam. Resting comfortably on gurney  Head:  Scalp is NC/AT  Eyes:  No scleral icterus, PERRL  ENT:  The external nose and ears are normal.   Neck:  Normal range of motion without rigidity.  CV:  Regular rate and rhythm    No pathologic murmur, rubs, or gallops.  Resp:  Breath sounds are clear bilaterally.  No crackles, wheezes, rhonchi, stridor.    Non-labored, no retractions or accessory muscle use  GI:  Abdomen is soft, no distension, no tenderness, no masses. No peritoneal signs.  Bowel sounds present in all quadrants  :  No suprapubic or flank tenderness  MS:  No lower extremity edema or asymmetric calf swelling. Normal ROM in all joints without effusions.    No midline cervical, thoracic, or lumbar tenderness  Skin:  Warm and dry, No rash or lesions noted. 2+ peripheral pulses in all extremities  Neuro: Oriented. No gross motor deficits. GCS 15    Strength and sensation grossly intact in all 4 extremities.  Cranial nerves 2-12 intact.  Normal finger to nose and heel to shin testing.  Gait normal  Psych: Awake. Alert. Normal affect. Appropriate interactions.    Emergency Department Course   ECG (12:20:58):  Rate 95 bpm. MD interval 144. QRS duration 80. QT/QTc 338/424. P-R-T axes 29 14 28. Normal sinus rhythm. Normal ECG. Interpreted at 1222 by Nahed Garland MD.    Imaging:  Radiology findings were communicated with the patient who voiced understanding of the findings.    XR Chest, portable, 1 view:  PA and lateral views of the chest were obtained.   Cardiomediastinal silhouette is within normal limits. No suspicious   focal pulmonary opacities. No significant pleural effusion or   pneumothorax. Unchanged S-shaped rotoscoliosis of the " thoracolumbar   spine.     Imaging independently reviewed and agree with radiologist interpretation.      Laboratory:  Laboratory findings were communicated with the patient who voiced understanding of the findings.    CBC: WBC 13.3 (H), o/w WNL (HGB 12.0, )    CMP: Anion Gap 1 (L), o/w WNL(Creatinine 0.63)    Troponin (Collected 1404): <0.015    Troponin (Collected 1626): <0.015    D-dimer: 0.3    TSH with free T4 reflex: 1.14    Interventions:  1430: NS 1L IV Bolus   1430: Ativan 0.5 mg IV    Emergency Department Course:  Past medical records, nursing notes, and vitals reviewed.    1403 I performed an exam of the patient as documented above.     EKG obtained in the ED, see results above.   IV was inserted and blood was drawn for laboratory testing, results above.  The patient was sent for a chest x-ray while in the emergency department, results above.     1545 I rechecked the patient and discussed the results of her workup thus far.     1700 I rechecked the patient. Explained findings to patient.  She feels improved.    Findings and plan explained to the Patient. Patient discharged home with instructions regarding supportive care, medications, and reasons to return. The importance of close follow-up was reviewed.     I personally reviewed the laboratory  and imaging results with the Patient and answered all related questions prior to discharge.     Impression & Plan   Medical Decision Makin year old female who presents with chest pain, lightheadedness, arm and leg pain.  Patient history and records reviewed. Broad differential considered including: ACS, PE, dissection, pneumothorax, pneumonia, esophageal rupture, musculoskeletal chest wall pain, referred pain from abdomen.  Patient well appearing with non-concerning vitals.  EKG without evidence of acute ischemia or arrythmia.  Initial and delta troponin undetectable, and patient is a HEART score of 1 (R1) and I feel ACS is unlikely.  D-dimer negative  and not high risk by Well's criteria making PE very unlikely.  Chest x-ray shows no evidence of pneumonia, pneumothorax, CHF, or mediastinal widening.  No other high risk factors present for aortic dissection very unlikely at this time.  Abdominal examination non-tender and doubt referred pain from intra-abdominal catastrophe, pancreatitis, gallbladder pathology.  She has no lower extremity swelling, and given negative d-dimer I doubt DVT.  She has a completely normal neurologic exam with no focal deficits to suggest CNS process or stroke.  Lab work-up is notable for mild leukocytosis, nonspecific but otherwise unremarkable.  She is afebrile without symptoms or evidence of infection.  No rashes or tick bites.  No evidence of UTI, meningitis, intra-abdominal catastrophe etc.    The patient feels improved following some Ativan and fluids and was able to ambulate without difficulty.  Unclear the etiology of the patient's symptoms today, though I do feel anxiety may be playing a role.  She will follow-up with her primary care provider in 2 to 3 days.  She will return if any new or worsening symptoms.      Diagnosis:    ICD-10-CM    1. Chest pain  R07.9    2. Dizziness  R42    3. Myalgia  M79.10        Disposition:  Discharged to home.    Scribe Disclosure:  I, Rajiv Betancourt, am serving as a scribe at 2:03 PM on 7/31/2020 to document services personally performed by Mario Ambrocio APC based on my observations and the provider's statements to me.        Mario Ambrocio PA-C  07/31/20 5521

## 2020-07-31 NOTE — ED AVS SNAPSHOT
Emergency Department  [address] [facility] [address]  Phone:  [phone]  Fax:  [phone]                                    Nicolasa Moore   MRN: [id number]    Department:   Emergency Department   Date of Visit:  7/31/2020           After Visit Summary Signature Page    I have received my discharge instructions, and my questions have been answered. I have discussed any challenges I see with this plan with the nurse or doctor.    ..........................................................................................................................................  Patient/Patient Representative Signature      ..........................................................................................................................................  Patient Representative Print Name and Relationship to Patient    ..................................................               ................................................  Date                                   Time    ..........................................................................................................................................  Reviewed by Signature/Title    ...................................................              ..............................................  Date                                               Time          22EPIC Rev 08/18

## 2020-07-31 NOTE — DISCHARGE INSTRUCTIONS
Discharge Instructions  Chest Pain    You have been seen today for chest pain or discomfort.  At this time, your provider has found no signs that your chest pain is due to a serious or life-threatening condition, (or you have declined more testing and/or admission to the hospital). However, sometimes there is a serious problem that does not show up right away. Your evaluation today may not be complete and you may need further testing and evaluation.     Generally, every Emergency Department visit should have a follow-up clinic visit with either a primary or a specialty clinic/provider. Please follow-up as instructed by your emergency provider today.  Return to the Emergency Department if:  Your chest pain changes, gets worse, starts to happen more often, or comes with less activity.  You are newly short of breath.  You get very weak or tired.  You pass out or faint.  You have any new symptoms, like fever, cough, numb legs, or you cough up blood.  You have anything else that worries you.    Until you follow-up with your regular provider, please do the following:  Take one aspirin daily unless you have an allergy or are told not to by your provider.  If a stress test appointment has been made, go to the appointment.  If you have questions, contact your regular provider.  Follow-up with your regular provider/clinic as directed; this is very important.    If you were given a prescription for medicine here today, be sure to read all of the information (including the package insert) that comes with your prescription.  This will include important information about the medicine, its side effects, and any warnings that you need to know about.  The pharmacist who fills the prescription can provide more information and answer questions you may have about the medicine.  If you have questions or concerns that the pharmacist cannot address, please call or return to the Emergency Department.       Remember that you can always come  back to the Emergency Department if you are not able to see your regular provider in the amount of time listed above, if you get any new symptoms, or if there is anything that worries you.  Discharge Instructions  Dizziness (Lightheaded)  Today you were seen for dizziness.  Dizziness can be caused by many things and it can be very difficult to determine the cause of dizziness.  At this time, your provider has found no signs that your dizziness is due to a serious or life-threatening condition. However, sometimes there is a serious problem that does not show up right away, and it is important for you to follow up with your regular provider as instructed.  Generally, every Emergency Department visit should have a follow-up clinic visit with either a primary or a specialty clinic/provider. Please follow-up as instructed by your emergency provider today.      Return to the Emergency Department if:    You pass out (fainting or falling out), especially during exercise.    You develop chest pain, chest pressure or difficulty breathing.  Your feel an irregular heartbeat.  You have excessive vaginal bleeding, or blood in your stool or vomit (throw up).  You have a high fever.  Your symptoms get worse or more frequent.    If when you begin to feel dizzy or lightheaded, it is important to sit down or lay down immediately to prevent injury from falling.  If you were given a prescription for medicine here today, be sure to read all of the information (including the package insert) that comes with your prescription.  This will include important information about the medicine, its side effects, and any warnings that you need to know about.  The pharmacist who fills the prescription can provide more information and answer questions you may have about the medicine.  If you have questions or concerns that the pharmacist cannot address, please call or return to the Emergency Department.   Remember that you can always come back to the  Emergency Department if you are not able to see your regular provider in the amount of time listed above, if you get any new symptoms, or if there is anything that worries you.

## 2020-08-01 LAB — INTERPRETATION ECG - MUSE: NORMAL

## 2020-12-20 ENCOUNTER — HEALTH MAINTENANCE LETTER (OUTPATIENT)
Age: 35
End: 2020-12-20

## 2021-04-18 ENCOUNTER — HEALTH MAINTENANCE LETTER (OUTPATIENT)
Age: 36
End: 2021-04-18

## 2021-05-31 ENCOUNTER — RECORDS - HEALTHEAST (OUTPATIENT)
Dept: ADMINISTRATIVE | Facility: CLINIC | Age: 36
End: 2021-05-31

## 2021-10-03 ENCOUNTER — HEALTH MAINTENANCE LETTER (OUTPATIENT)
Age: 36
End: 2021-10-03

## 2022-04-20 ENCOUNTER — HOSPITAL ENCOUNTER (OUTPATIENT)
Dept: MRI IMAGING | Facility: CLINIC | Age: 37
Discharge: HOME | End: 2022-04-20
Attending: INTERNAL MEDICINE | Admitting: INTERNAL MEDICINE
Payer: COMMERCIAL

## 2022-04-20 DIAGNOSIS — R16.1 SPLENIC MASS: ICD-10-CM

## 2022-04-20 PROCEDURE — 74183 MRI ABD W/O CNTR FLWD CNTR: CPT

## 2022-04-20 PROCEDURE — 255N000002 HC RX 255 OP 636: Performed by: INTERNAL MEDICINE

## 2022-04-20 PROCEDURE — A9585 GADOBUTROL INJECTION: HCPCS | Performed by: INTERNAL MEDICINE

## 2022-04-20 RX ORDER — GADOBUTROL 604.72 MG/ML
10 INJECTION INTRAVENOUS ONCE
Status: COMPLETED | OUTPATIENT
Start: 2022-04-20 | End: 2022-04-20

## 2022-04-20 RX ADMIN — GADOBUTROL 10 ML: 604.72 INJECTION INTRAVENOUS at 19:20

## 2022-04-21 LAB — RADIOLOGIST FLAGS: NORMAL

## 2022-05-15 ENCOUNTER — HEALTH MAINTENANCE LETTER (OUTPATIENT)
Age: 37
End: 2022-05-15

## 2022-06-13 ENCOUNTER — LAB (OUTPATIENT)
Dept: URGENT CARE | Facility: URGENT CARE | Age: 37
End: 2022-06-13
Payer: COMMERCIAL

## 2022-06-13 DIAGNOSIS — Z20.822 SUSPECTED COVID-19 VIRUS INFECTION: ICD-10-CM

## 2022-06-13 LAB — SARS-COV-2 RNA RESP QL NAA+PROBE: POSITIVE

## 2022-06-13 PROCEDURE — U0003 INFECTIOUS AGENT DETECTION BY NUCLEIC ACID (DNA OR RNA); SEVERE ACUTE RESPIRATORY SYNDROME CORONAVIRUS 2 (SARS-COV-2) (CORONAVIRUS DISEASE [COVID-19]), AMPLIFIED PROBE TECHNIQUE, MAKING USE OF HIGH THROUGHPUT TECHNOLOGIES AS DESCRIBED BY CMS-2020-01-R: HCPCS

## 2022-06-13 PROCEDURE — U0005 INFEC AGEN DETEC AMPLI PROBE: HCPCS

## 2022-09-10 ENCOUNTER — HEALTH MAINTENANCE LETTER (OUTPATIENT)
Age: 37
End: 2022-09-10

## 2023-06-03 ENCOUNTER — HEALTH MAINTENANCE LETTER (OUTPATIENT)
Age: 38
End: 2023-06-03

## 2024-07-07 ENCOUNTER — HEALTH MAINTENANCE LETTER (OUTPATIENT)
Age: 39
End: 2024-07-07

## 2025-05-11 ENCOUNTER — HEALTH MAINTENANCE LETTER (OUTPATIENT)
Age: 40
End: 2025-05-11

## (undated) DEVICE — CATH TRAY FOLEY 16FR BARDEX W/DRAIN BAG STATLOCK 300316A

## (undated) DEVICE — SU CHROMIC 1 CT 27" 803H

## (undated) DEVICE — ESU GROUND PAD UNIVERSAL W/O CORD

## (undated) DEVICE — PACK C-SECTION LF PL15OTA83B

## (undated) DEVICE — SU CHROMIC 2-0 SH 27" G123H

## (undated) DEVICE — DRSG STERI STRIP 1/2X4" R1547

## (undated) DEVICE — BLADE CLIPPER 4406

## (undated) DEVICE — SU PLAIN 3-0 CT 27" 852H

## (undated) DEVICE — LINEN C-SECTION 5415

## (undated) DEVICE — SU VICRYL 0 CT-1 27" J340H

## (undated) DEVICE — SUCTION CANISTER MEDIVAC LINER 3000ML W/LID 65651-530

## (undated) DEVICE — GLOVE PROTEXIS BLUE W/NEU-THERA 7.5  2D73EB75

## (undated) DEVICE — SU VICRYL 2-0 CT-1 27" J339H

## (undated) DEVICE — SOL NACL 0.9% IRRIG 1000ML BOTTLE 07138-09

## (undated) DEVICE — SU CHROMIC 3-0 SH 27" G122H

## (undated) DEVICE — PREP CHLORAPREP 26ML TINTED ORANGE  260815